# Patient Record
Sex: MALE | Race: BLACK OR AFRICAN AMERICAN | NOT HISPANIC OR LATINO | Employment: FULL TIME | URBAN - METROPOLITAN AREA
[De-identification: names, ages, dates, MRNs, and addresses within clinical notes are randomized per-mention and may not be internally consistent; named-entity substitution may affect disease eponyms.]

---

## 2019-08-12 ENCOUNTER — OFFICE VISIT (OUTPATIENT)
Dept: FAMILY MEDICINE CLINIC | Facility: CLINIC | Age: 29
End: 2019-08-12

## 2019-08-12 VITALS
HEIGHT: 75 IN | DIASTOLIC BLOOD PRESSURE: 86 MMHG | HEART RATE: 80 BPM | TEMPERATURE: 97.9 F | SYSTOLIC BLOOD PRESSURE: 140 MMHG | WEIGHT: 310.2 LBS | BODY MASS INDEX: 38.57 KG/M2 | RESPIRATION RATE: 16 BRPM | OXYGEN SATURATION: 98 %

## 2019-08-12 DIAGNOSIS — Z00.00 HEALTHCARE MAINTENANCE: Primary | ICD-10-CM

## 2019-08-12 LAB
ALBUMIN SERPL-MCNC: 4.6 G/DL (ref 3.5–5.2)
ALBUMIN/GLOB SERPL: 1.8 G/DL
ALP SERPL-CCNC: 56 U/L (ref 39–117)
ALT SERPL W P-5'-P-CCNC: 11 U/L (ref 1–41)
ANION GAP SERPL CALCULATED.3IONS-SCNC: 10.3 MMOL/L (ref 5–15)
AST SERPL-CCNC: 21 U/L (ref 1–40)
BASOPHILS # BLD AUTO: 0.05 10*3/MM3 (ref 0–0.2)
BASOPHILS NFR BLD AUTO: 0.6 % (ref 0–1.5)
BILIRUB BLD-MCNC: NEGATIVE MG/DL
BILIRUB SERPL-MCNC: 0.7 MG/DL (ref 0.2–1.2)
BUN BLD-MCNC: 16 MG/DL (ref 6–20)
BUN/CREAT SERPL: 16.5 (ref 7–25)
CALCIUM SPEC-SCNC: 9.7 MG/DL (ref 8.6–10.5)
CHLORIDE SERPL-SCNC: 101 MMOL/L (ref 98–107)
CHOLEST SERPL-MCNC: 134 MG/DL (ref 0–200)
CLARITY, POC: CLEAR
CO2 SERPL-SCNC: 26.7 MMOL/L (ref 22–29)
COLOR UR: YELLOW
CREAT BLD-MCNC: 0.97 MG/DL (ref 0.76–1.27)
CRP SERPL-MCNC: 0.46 MG/DL (ref 0–0.5)
DEPRECATED RDW RBC AUTO: 43 FL (ref 37–54)
EOSINOPHIL # BLD AUTO: 0.12 10*3/MM3 (ref 0–0.4)
EOSINOPHIL NFR BLD AUTO: 1.5 % (ref 0.3–6.2)
ERYTHROCYTE [DISTWIDTH] IN BLOOD BY AUTOMATED COUNT: 13.3 % (ref 12.3–15.4)
EXPIRATION DATE: ABNORMAL
GFR SERPL CREATININE-BSD FRML MDRD: 112 ML/MIN/1.73
GLOBULIN UR ELPH-MCNC: 2.5 GM/DL
GLUCOSE BLD-MCNC: 99 MG/DL (ref 65–99)
GLUCOSE UR STRIP-MCNC: NEGATIVE MG/DL
HBA1C MFR BLD: 5.3 % (ref 4.8–5.6)
HCT VFR BLD AUTO: 52.2 % (ref 37.5–51)
HDLC SERPL-MCNC: 53 MG/DL (ref 40–60)
HGB BLD-MCNC: 16.3 G/DL (ref 13–17.7)
HIV1+2 AB SER QL: NORMAL
IMM GRANULOCYTES # BLD AUTO: 0.01 10*3/MM3 (ref 0–0.05)
IMM GRANULOCYTES NFR BLD AUTO: 0.1 % (ref 0–0.5)
KETONES UR QL: NEGATIVE
LDLC SERPL CALC-MCNC: 68 MG/DL (ref 0–100)
LDLC/HDLC SERPL: 1.28 {RATIO}
LEUKOCYTE EST, POC: NEGATIVE
LYMPHOCYTES # BLD AUTO: 2.88 10*3/MM3 (ref 0.7–3.1)
LYMPHOCYTES NFR BLD AUTO: 36 % (ref 19.6–45.3)
Lab: ABNORMAL
MCH RBC QN AUTO: 27.8 PG (ref 26.6–33)
MCHC RBC AUTO-ENTMCNC: 31.2 G/DL (ref 31.5–35.7)
MCV RBC AUTO: 88.9 FL (ref 79–97)
MONOCYTES # BLD AUTO: 0.58 10*3/MM3 (ref 0.1–0.9)
MONOCYTES NFR BLD AUTO: 7.3 % (ref 5–12)
NEUTROPHILS # BLD AUTO: 4.36 10*3/MM3 (ref 1.7–7)
NEUTROPHILS NFR BLD AUTO: 54.5 % (ref 42.7–76)
NITRITE UR-MCNC: NEGATIVE MG/ML
NRBC BLD AUTO-RTO: 0 /100 WBC (ref 0–0.2)
PH UR: 5.5 [PH] (ref 5–8)
PLATELET # BLD AUTO: 298 10*3/MM3 (ref 140–450)
PMV BLD AUTO: 10.8 FL (ref 6–12)
POTASSIUM BLD-SCNC: 4.2 MMOL/L (ref 3.5–5.2)
PROT SERPL-MCNC: 7.1 G/DL (ref 6–8.5)
PROT UR STRIP-MCNC: NEGATIVE MG/DL
RBC # BLD AUTO: 5.87 10*6/MM3 (ref 4.14–5.8)
RBC # UR STRIP: ABNORMAL /UL
RPR SER QL: NORMAL
SODIUM BLD-SCNC: 138 MMOL/L (ref 136–145)
SP GR UR: 1.02 (ref 1–1.03)
TRIGL SERPL-MCNC: 65 MG/DL (ref 0–150)
TSH SERPL DL<=0.05 MIU/L-ACNC: 2.04 MIU/ML (ref 0.27–4.2)
URATE SERPL-MCNC: 6.4 MG/DL (ref 3.4–7)
UROBILINOGEN UR QL: NORMAL
VLDLC SERPL-MCNC: 13 MG/DL (ref 5–40)
WBC NRBC COR # BLD: 8 10*3/MM3 (ref 3.4–10.8)

## 2019-08-12 PROCEDURE — 86140 C-REACTIVE PROTEIN: CPT | Performed by: FAMILY MEDICINE

## 2019-08-12 PROCEDURE — 80053 COMPREHEN METABOLIC PANEL: CPT | Performed by: FAMILY MEDICINE

## 2019-08-12 PROCEDURE — 84550 ASSAY OF BLOOD/URIC ACID: CPT | Performed by: FAMILY MEDICINE

## 2019-08-12 PROCEDURE — 85025 COMPLETE CBC W/AUTO DIFF WBC: CPT | Performed by: FAMILY MEDICINE

## 2019-08-12 PROCEDURE — 81003 URINALYSIS AUTO W/O SCOPE: CPT | Performed by: FAMILY MEDICINE

## 2019-08-12 PROCEDURE — 86592 SYPHILIS TEST NON-TREP QUAL: CPT | Performed by: FAMILY MEDICINE

## 2019-08-12 PROCEDURE — 83036 HEMOGLOBIN GLYCOSYLATED A1C: CPT | Performed by: FAMILY MEDICINE

## 2019-08-12 PROCEDURE — G0432 EIA HIV-1/HIV-2 SCREEN: HCPCS | Performed by: FAMILY MEDICINE

## 2019-08-12 PROCEDURE — 36415 COLL VENOUS BLD VENIPUNCTURE: CPT | Performed by: FAMILY MEDICINE

## 2019-08-12 PROCEDURE — 80061 LIPID PANEL: CPT | Performed by: FAMILY MEDICINE

## 2019-08-12 PROCEDURE — 84443 ASSAY THYROID STIM HORMONE: CPT | Performed by: FAMILY MEDICINE

## 2019-08-12 PROCEDURE — 99385 PREV VISIT NEW AGE 18-39: CPT | Performed by: FAMILY MEDICINE

## 2019-08-12 NOTE — PROGRESS NOTES
"Subjective   Kelvin Borjas is a 28 y.o. male.     History of Present Illness   He is here for his annual fasting wellness evaluation.  He is current on his immunizations.  He voices concerns with his left plantar fascia and pain with first step of the day.    Medical History  I have reviewed and updated the following portions of the patient's history: allergies, current medications, past medical history, past surgical history, past family history, and past social history.    Review of Systems   Constitutional: Negative.    HENT: Negative.    Eyes: Negative.    Respiratory: Negative.    Cardiovascular: Negative.    Gastrointestinal: Negative.    Endocrine: Negative.    Genitourinary: Negative.    Musculoskeletal: Negative.         Left plantar fascia & achilles tightness attributed to \"walking on concrete all day.\"   Skin: Negative.    Allergic/Immunologic: Negative.    Neurological: Negative.    Hematological: Negative.    Psychiatric/Behavioral: Negative.      Objective   /86   Pulse 80   Temp 97.9 °F (36.6 °C) (Temporal)   Resp 16   Ht 190.5 cm (75\")   Wt (!) 141 kg (310 lb 3.2 oz)   SpO2 98%   BMI 38.77 kg/m²   Physical Exam   Constitutional: He is oriented to person, place, and time. He appears well-developed and well-nourished. He is cooperative.   HENT:   Head: Normocephalic and atraumatic.   Right Ear: Hearing and external ear normal.   Left Ear: Hearing and external ear normal.   Nose: Nose normal.   Mouth/Throat: Uvula is midline, oropharynx is clear and moist and mucous membranes are normal.   Eyes: Conjunctivae and EOM are normal. Pupils are equal, round, and reactive to light. No scleral icterus.   Neck: Trachea normal and normal range of motion. Neck supple. No JVD present. Carotid bruit is not present. No thyromegaly present.   Cardiovascular: Normal rate, regular rhythm, normal heart sounds and intact distal pulses.   Pulmonary/Chest: Effort normal and breath sounds normal. "   Abdominal: Soft. Bowel sounds are normal. There is no hepatosplenomegaly. There is no tenderness.   Musculoskeletal: Normal range of motion.   Bilateral pes planus identified.  Left plantar fascia tenderness   Lymphadenopathy:     He has no cervical adenopathy.   Neurological: He is alert and oriented to person, place, and time. He has normal strength and normal reflexes. No sensory deficit. Gait normal.   Skin: Skin is warm and dry.   Psychiatric: He has a normal mood and affect. His speech is normal and behavior is normal. Judgment and thought content normal. Cognition and memory are normal.   Nursing note and vitals reviewed.      Assessment/Plan   Diagnoses and all orders for this visit:    Healthcare maintenance  -     POC Urinalysis Dipstick, Automated  -     Comprehensive Metabolic Panel  -     CBC & Differential  -     Lipid Panel  -     TSH  -     Uric Acid  -     C-reactive Protein  -     HIV-1 / O / 2 Ag / Antibody 4th Generation  -     Chlamydia trachomatis, Neisseria gonorrhoeae, PCR - Urine, Urine, Clean Catch  -     RPR  -     Hemoglobin A1c    Elevated BP without a diagnosis of HTN  - Healthy heart diet  - Low sodium diet  - Daily aerobic exercise  - Routine blood pressure monitoring  - I encouraged routine BP monitoring with work place clinic/nurse  - Return to clinic if resting BP's consistently > 130/80  - [place] Heart Disease and Stroke Prevention Task Force pamphlet reviewed and administered.    Plantar fasciitis        - Stretches reviewed        - See PT at work clinic        - Inserts        - Weight loss        - Ice massage        - Naproxen with food prn    The patient is here for a health maintenance visit.  Currently, the patient consumes a calorie enriched diet and has an inadequate exercise regimen. Screening lab work is ordered.  Immunizations are reviewed today.  Advice and education is given regarding nutrition, aerobic exercise, routine dental evaluations, routine eye exams,  reproductive health, cardiovascular risk reduction, sunscreen use, self skin examination (annual dermatology evaluations) and seat belt use (general overall safety).  Further recommendations after lab evaluation.  Annual wellness evaluations recommended.

## 2019-09-23 ENCOUNTER — OFFICE VISIT (OUTPATIENT)
Dept: FAMILY MEDICINE CLINIC | Facility: CLINIC | Age: 29
End: 2019-09-23

## 2019-09-23 VITALS
RESPIRATION RATE: 20 BRPM | OXYGEN SATURATION: 98 % | TEMPERATURE: 97.9 F | WEIGHT: 310.8 LBS | SYSTOLIC BLOOD PRESSURE: 122 MMHG | HEIGHT: 75 IN | HEART RATE: 92 BPM | BODY MASS INDEX: 38.64 KG/M2 | DIASTOLIC BLOOD PRESSURE: 74 MMHG

## 2019-09-23 DIAGNOSIS — R03.0 ELEVATED BLOOD PRESSURE READING: Primary | ICD-10-CM

## 2019-09-23 PROCEDURE — 99212 OFFICE O/P EST SF 10 MIN: CPT | Performed by: FAMILY MEDICINE

## 2019-09-23 NOTE — PROGRESS NOTES
Subjective   Kelvin Borjas is a 29 y.o. male.     History of Present Illness   He is here to follow up on his elevated blood pressure.  He has implemented lifestyle changes to include routine exercise.  He continues to struggle with weight loss and daily calorie restriction.  He reports improved blood pressure readings at home.  He voices no other concerns.    Review of Systems   Respiratory: Negative for shortness of breath.    Cardiovascular: Negative for chest pain, palpitations and leg swelling.   Neurological: Negative for headaches.       Objective   Physical Exam   Constitutional: He is oriented to person, place, and time. He appears well-developed and well-nourished.   HENT:   Head: Normocephalic and atraumatic.   Right Ear: Hearing normal.   Left Ear: Hearing normal.   Mouth/Throat: Mucous membranes are normal.   Eyes: Conjunctivae and EOM are normal. Pupils are equal, round, and reactive to light.   Neck: Neck supple. No JVD present. No thyromegaly present.   Cardiovascular: Normal rate, regular rhythm and normal heart sounds.   Pulmonary/Chest: Effort normal and breath sounds normal.   Musculoskeletal: Normal range of motion.   Neurological: He is alert and oriented to person, place, and time. He has normal strength. No sensory deficit. Gait normal.   Skin: Skin is warm and dry.   Psychiatric: He has a normal mood and affect. His behavior is normal. Judgment and thought content normal. Cognition and memory are normal.   Nursing note and vitals reviewed.      Assessment/Plan   Diagnoses and all orders for this visit:    Elevated blood pressure reading resolved on today's evaluation and home BP monitoring  - Healthy heart diet / DASH diet  - Low sodium diet  - Daily aerobic exercise > 40' > 3 x / week  - Routine blood pressure monitoring  - [place] Heart Disease and Stroke Prevention Task Force pamphlet reviewed and administered.

## 2020-08-31 PROCEDURE — 99283 EMERGENCY DEPT VISIT LOW MDM: CPT

## 2020-09-01 ENCOUNTER — APPOINTMENT (OUTPATIENT)
Dept: GENERAL RADIOLOGY | Facility: HOSPITAL | Age: 30
End: 2020-09-01

## 2020-09-01 ENCOUNTER — HOSPITAL ENCOUNTER (EMERGENCY)
Facility: HOSPITAL | Age: 30
Discharge: HOME | End: 2020-09-01
Attending: EMERGENCY MEDICINE | Admitting: EMERGENCY MEDICINE

## 2020-09-01 VITALS
OXYGEN SATURATION: 98 % | BODY MASS INDEX: 39.17 KG/M2 | HEART RATE: 66 BPM | DIASTOLIC BLOOD PRESSURE: 85 MMHG | SYSTOLIC BLOOD PRESSURE: 145 MMHG | RESPIRATION RATE: 18 BRPM | TEMPERATURE: 98 F | WEIGHT: 315 LBS | HEIGHT: 75 IN

## 2020-09-01 DIAGNOSIS — S39.012A STRAIN OF MUSCLE, FASCIA AND TENDON OF LOWER BACK, INITIAL ENCOUNTER: Primary | ICD-10-CM

## 2020-09-01 PROCEDURE — 72100 X-RAY EXAM L-S SPINE 2/3 VWS: CPT

## 2020-09-01 RX ORDER — TRAMADOL HYDROCHLORIDE 50 MG/1
50 TABLET ORAL EVERY 6 HOURS PRN
Qty: 12 TABLET | Refills: 0 | Status: SHIPPED | OUTPATIENT
Start: 2020-09-01 | End: 2021-02-22

## 2020-09-01 RX ORDER — TRAMADOL HYDROCHLORIDE 50 MG/1
50 TABLET ORAL ONCE
Status: COMPLETED | OUTPATIENT
Start: 2020-09-01 | End: 2020-09-01

## 2020-09-01 RX ADMIN — TRAMADOL HYDROCHLORIDE 50 MG: 50 TABLET, FILM COATED ORAL at 04:17

## 2020-09-01 NOTE — DISCHARGE INSTRUCTIONS
Apply heat to the low back and perform regular stretching.    Take Tylenol or ibuprofen to help with pain.  Take Ultram as needed for more severe pain.    Follow-up with primary care physician for recheck in 1 week.

## 2020-09-01 NOTE — ED PROVIDER NOTES
Subjective   49-year-old male presents with complaint of midline low back pain.  The patient reports that about 8 PM yesterday he was attempting to pull a weed out the ground and felt a pop in the midline of his low back.  He reports he was unable to follow-up the week.  The pain is isolated to the low back.  It is exacerbated whenever he sitting up or standing up and improved whenever he is lying flat.  He reports taking some ibuprofen with very minimal improvement in pain.  He denies any previous direct trauma or injury to his low back.  No previous surgical intervention to his back.  No reported fever or systemic symptoms of infection.  He denies any history of cancer or unexpected weight loss.  He denies any saddle anesthesia, bowel incontinence or retention, or urinary incontinence or retention.  Reported infectious symptoms or respiratory symptoms.  He has been able to stand and ambulate since the accident occurred.          Review of Systems   Constitutional: Negative for chills, fatigue and fever.   HENT: Negative for congestion, ear pain, postnasal drip, sinus pressure and sore throat.    Eyes: Negative for pain, redness and visual disturbance.   Respiratory: Negative for cough, chest tightness and shortness of breath.    Cardiovascular: Negative for chest pain, palpitations and leg swelling.   Gastrointestinal: Negative for abdominal pain, anal bleeding, blood in stool, diarrhea, nausea and vomiting.   Endocrine: Negative for polydipsia and polyuria.   Genitourinary: Negative for difficulty urinating, dysuria, frequency and urgency.   Musculoskeletal: Positive for back pain. Negative for arthralgias and neck pain.   Skin: Negative for pallor and rash.   Allergic/Immunologic: Negative for environmental allergies and immunocompromised state.   Neurological: Negative for dizziness, weakness and headaches.   Hematological: Negative for adenopathy.   Psychiatric/Behavioral: Negative for confusion, self-injury  and suicidal ideas. The patient is not nervous/anxious.    All other systems reviewed and are negative.      Past Medical History:   Diagnosis Date   • Obesity    • Pes planus        No Known Allergies    Past Surgical History:   Procedure Laterality Date   • NO PAST SURGERIES         Family History   Problem Relation Age of Onset   • Hypertension Mother    • Hypertension Father        Social History     Socioeconomic History   • Marital status: Single     Spouse name: Etta   • Number of children: 1   • Years of education: H.S.   • Highest education level: High school graduate   Occupational History   • Occupation: Associate     Employer: [employer]   Tobacco Use   • Smoking status: Never Smoker   • Smokeless tobacco: Never Used   Substance and Sexual Activity   • Alcohol use: Yes     Frequency: Monthly or less   • Drug use: No   • Sexual activity: Yes     Partners: Female           Objective   Physical Exam   Constitutional: He is oriented to person, place, and time. He appears well-developed and well-nourished.  Non-toxic appearance. No distress.   HENT:   Head: Normocephalic and atraumatic.   Right Ear: External ear normal.   Left Ear: External ear normal.   Nose: Nose normal.   Eyes: Pupils are equal, round, and reactive to light. EOM and lids are normal.   Neck: Normal range of motion. Neck supple. No tracheal deviation present.   Cardiovascular: Normal rate, regular rhythm and normal heart sounds. Exam reveals no gallop, no friction rub and no decreased pulses.   No murmur heard.  Pulmonary/Chest: Effort normal and breath sounds normal. No respiratory distress. He has no decreased breath sounds. He has no wheezes. He has no rhonchi. He has no rales.   Abdominal: Soft. Normal appearance and bowel sounds are normal. There is no tenderness. There is no rebound and no guarding.   Musculoskeletal: Normal range of motion. He exhibits no deformity.        Lumbar back: He exhibits tenderness and bony tenderness. He  exhibits normal range of motion and no deformity.        Back:    Lymphadenopathy:     He has no cervical adenopathy.   Neurological: He is alert and oriented to person, place, and time. He has normal strength. No cranial nerve deficit or sensory deficit.   Skin: Skin is warm and dry. No rash noted. He is not diaphoretic.   Psychiatric: He has a normal mood and affect. His speech is normal and behavior is normal. Judgment and thought content normal. Cognition and memory are normal.   Nursing note and vitals reviewed.      Procedures           ED Course                                           MDM  Number of Diagnoses or Management Options  Strain of muscle, fascia and tendon of lower back, initial encounter: new and requires workup  Diagnosis management comments: No acute injuries identified on x-ray of the lumbar spine.    Patient be discharged and advised to apply heat for no more than 20 minutes at a time before regular stretching and light activity such as walking.    We will advised to take Tylenol or ibuprofen as needed help with pain, take Ultram as needed for more severe pain.    Follow-up with primary care physician for recheck in 1 week.       Amount and/or Complexity of Data Reviewed  Tests in the radiology section of CPT®: ordered and reviewed  Review and summarize past medical records: yes  Independent visualization of images, tracings, or specimens: yes        Final diagnoses:   Strain of muscle, fascia and tendon of lower back, initial encounter            Carla Case MD  09/01/20 2721

## 2021-02-22 ENCOUNTER — HOSPITAL ENCOUNTER (OUTPATIENT)
Dept: GENERAL RADIOLOGY | Facility: HOSPITAL | Age: 31
Discharge: HOME | End: 2021-02-22
Admitting: FAMILY MEDICINE

## 2021-02-22 ENCOUNTER — OFFICE VISIT (OUTPATIENT)
Dept: FAMILY MEDICINE CLINIC | Facility: CLINIC | Age: 31
End: 2021-02-22

## 2021-02-22 VITALS
RESPIRATION RATE: 16 BRPM | HEIGHT: 75 IN | DIASTOLIC BLOOD PRESSURE: 78 MMHG | OXYGEN SATURATION: 98 % | SYSTOLIC BLOOD PRESSURE: 138 MMHG | WEIGHT: 315 LBS | TEMPERATURE: 98 F | HEART RATE: 77 BPM | BODY MASS INDEX: 39.17 KG/M2

## 2021-02-22 DIAGNOSIS — T81.506A: ICD-10-CM

## 2021-02-22 DIAGNOSIS — Z00.00 ANNUAL PHYSICAL EXAM: ICD-10-CM

## 2021-02-22 DIAGNOSIS — T81.506A: Primary | ICD-10-CM

## 2021-02-22 PROCEDURE — 73130 X-RAY EXAM OF HAND: CPT

## 2021-02-22 PROCEDURE — 99395 PREV VISIT EST AGE 18-39: CPT | Performed by: FAMILY MEDICINE

## 2021-02-22 PROCEDURE — 99213 OFFICE O/P EST LOW 20 MIN: CPT | Performed by: FAMILY MEDICINE
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
## 2021-02-22 NOTE — PATIENT INSTRUCTIONS

## 2024-08-08 ENCOUNTER — TELEPHONE (OUTPATIENT)
Dept: FAMILY MEDICINE CLINIC | Facility: CLINIC | Age: 34
End: 2024-08-08
Payer: COMMERCIAL

## 2024-08-08 NOTE — TELEPHONE ENCOUNTER
Pt called stating he has been suffering from chest pain/ tightness, reports nurse friend of his had taken his BP and pulse and reported it sounded as tho his heartbeat is irregular. Pt is establishing care w/ this office but has not yet been seen here, appt 9/10. Advised Pt to seek care at ER or [facility] given gravity of symptoms and gap between now and appt date. Pt stated he understood

## 2024-08-13 ENCOUNTER — HOSPITAL ENCOUNTER (EMERGENCY)
Facility: HOSPITAL | Age: 34
Discharge: HOME | End: 2024-08-14
Attending: EMERGENCY MEDICINE
Payer: COMMERCIAL

## 2024-08-13 ENCOUNTER — APPOINTMENT (OUTPATIENT)
Facility: HOSPITAL | Age: 34
End: 2024-08-13
Payer: COMMERCIAL

## 2024-08-13 DIAGNOSIS — R00.2 PALPITATION: Primary | ICD-10-CM

## 2024-08-13 LAB
ALBUMIN SERPL-MCNC: 3.8 G/DL (ref 3.5–5.2)
ALBUMIN/GLOB SERPL: 1.4 G/DL
ALP SERPL-CCNC: 53 U/L (ref 39–117)
ALT SERPL W P-5'-P-CCNC: 18 U/L (ref 1–41)
ANION GAP SERPL CALCULATED.3IONS-SCNC: 9 MMOL/L (ref 5–15)
AST SERPL-CCNC: 26 U/L (ref 1–40)
BASOPHILS # BLD AUTO: 0.03 10*3/MM3 (ref 0–0.2)
BASOPHILS NFR BLD AUTO: 0.4 % (ref 0–1.5)
BILIRUB SERPL-MCNC: 0.4 MG/DL (ref 0–1.2)
BUN SERPL-MCNC: 12 MG/DL (ref 6–20)
BUN/CREAT SERPL: 14 (ref 7–25)
CALCIUM SPEC-SCNC: 9.3 MG/DL (ref 8.6–10.5)
CHLORIDE SERPL-SCNC: 106 MMOL/L (ref 98–107)
CO2 SERPL-SCNC: 23 MMOL/L (ref 22–29)
CREAT SERPL-MCNC: 0.86 MG/DL (ref 0.76–1.27)
DEPRECATED RDW RBC AUTO: 38.3 FL (ref 37–54)
EGFRCR SERPLBLD CKD-EPI 2021: 117.3 ML/MIN/1.73
EOSINOPHIL # BLD AUTO: 0.17 10*3/MM3 (ref 0–0.4)
EOSINOPHIL NFR BLD AUTO: 2.2 % (ref 0.3–6.2)
ERYTHROCYTE [DISTWIDTH] IN BLOOD BY AUTOMATED COUNT: 12.5 % (ref 12.3–15.4)
GLOBULIN UR ELPH-MCNC: 2.7 GM/DL
GLUCOSE SERPL-MCNC: 96 MG/DL (ref 65–99)
HCT VFR BLD AUTO: 44.1 % (ref 37.5–51)
HGB BLD-MCNC: 15.1 G/DL (ref 13–17.7)
IMM GRANULOCYTES # BLD AUTO: 0.02 10*3/MM3 (ref 0–0.05)
IMM GRANULOCYTES NFR BLD AUTO: 0.3 % (ref 0–0.5)
LYMPHOCYTES # BLD AUTO: 2.28 10*3/MM3 (ref 0.7–3.1)
LYMPHOCYTES NFR BLD AUTO: 29.5 % (ref 19.6–45.3)
MCH RBC QN AUTO: 28.5 PG (ref 26.6–33)
MCHC RBC AUTO-ENTMCNC: 34.2 G/DL (ref 31.5–35.7)
MCV RBC AUTO: 83.2 FL (ref 79–97)
MONOCYTES # BLD AUTO: 0.67 10*3/MM3 (ref 0.1–0.9)
MONOCYTES NFR BLD AUTO: 8.7 % (ref 5–12)
NEUTROPHILS NFR BLD AUTO: 4.56 10*3/MM3 (ref 1.7–7)
NEUTROPHILS NFR BLD AUTO: 58.9 % (ref 42.7–76)
PLATELET # BLD AUTO: 277 10*3/MM3 (ref 140–450)
PMV BLD AUTO: 10.5 FL (ref 6–12)
POTASSIUM SERPL-SCNC: 4.2 MMOL/L (ref 3.5–5.2)
PROT SERPL-MCNC: 6.5 G/DL (ref 6–8.5)
RBC # BLD AUTO: 5.3 10*6/MM3 (ref 4.14–5.8)
SODIUM SERPL-SCNC: 138 MMOL/L (ref 136–145)
TROPONIN T SERPL HS-MCNC: 33 NG/L
WBC NRBC COR # BLD AUTO: 7.73 10*3/MM3 (ref 3.4–10.8)

## 2024-08-13 PROCEDURE — 85025 COMPLETE CBC W/AUTO DIFF WBC: CPT | Performed by: EMERGENCY MEDICINE

## 2024-08-13 PROCEDURE — 71045 X-RAY EXAM CHEST 1 VIEW: CPT

## 2024-08-13 PROCEDURE — 99284 EMERGENCY DEPT VISIT MOD MDM: CPT

## 2024-08-13 PROCEDURE — 93005 ELECTROCARDIOGRAM TRACING: CPT | Performed by: EMERGENCY MEDICINE

## 2024-08-13 PROCEDURE — 84484 ASSAY OF TROPONIN QUANT: CPT | Performed by: EMERGENCY MEDICINE

## 2024-08-13 PROCEDURE — 80053 COMPREHEN METABOLIC PANEL: CPT | Performed by: EMERGENCY MEDICINE

## 2024-08-13 PROCEDURE — 36415 COLL VENOUS BLD VENIPUNCTURE: CPT

## 2024-08-13 NOTE — Clinical Note
[facility] EMERGENCY DEPARTMENT [place]  [address]  [facility] [address]  Phone: [phone]  Fax: [phone]    Kelvin Borjas was seen and treated in our emergency department on 8/13/2024.  He may return to work on 08/15/2024.         Thank you for choosing [facility].    Familia Jeronimo MD

## 2024-08-13 NOTE — Clinical Note
[facility] EMERGENCY DEPARTMENT [place]  [address]  [place] [facility] [place] [address]  Phone: [phone]  Fax: [phone]    Kelvin Borjas was seen and treated in our emergency department on 8/13/2024.  He may return to work on 08/15/2024.         Thank you for choosing [facility].    Familia Jeronimo MD

## 2024-08-14 VITALS
BODY MASS INDEX: 39.17 KG/M2 | HEART RATE: 84 BPM | WEIGHT: 315 LBS | HEIGHT: 75 IN | TEMPERATURE: 98.4 F | SYSTOLIC BLOOD PRESSURE: 154 MMHG | OXYGEN SATURATION: 100 % | DIASTOLIC BLOOD PRESSURE: 84 MMHG | RESPIRATION RATE: 20 BRPM

## 2024-08-14 LAB
GEN 5 2HR TROPONIN T REFLEX: 36 NG/L
TROPONIN T DELTA: 3 NG/L

## 2024-08-14 PROCEDURE — 84484 ASSAY OF TROPONIN QUANT: CPT | Performed by: EMERGENCY MEDICINE

## 2024-08-14 NOTE — FSED PROVIDER NOTE
Subjective   History of Present Illness  Patient presents to the emergency department for palpitations.  Says she has had palpitations since 2020 happen randomly sometimes as infrequently as every few months.  Says it feels like his heart starts beating and usually does not feeling as racing feels almost like it is beating slower.  He denies chest pain during these times.  Had an episode earlier today at work and they instructed him to come here.  He is asymptomatic at this time says he is not having any pain pressure or palpitations.  No shortness of breath lightheadedness recent illnesses or other symptoms.    History provided by:  Patient   used: No        Review of Systems   Cardiovascular:  Positive for palpitations.   All other systems reviewed and are negative.      Past Medical History:   Diagnosis Date    Obesity     Pes planus        No Known Allergies    Past Surgical History:   Procedure Laterality Date    NO PAST SURGERIES         Family History   Problem Relation Age of Onset    Hypertension Mother     Hypertension Father     Prostate cancer Father     No Known Problems Brother     Eczema Daughter     Eczema Son     No Known Problems Maternal Grandmother     No Known Problems Maternal Grandfather     Ovarian cancer Paternal Grandmother     Breast cancer Paternal Grandmother     Glaucoma Paternal Grandfather        Social History     Socioeconomic History    Marital status: Single     Spouse name: Etta    Number of children: 1    Years of education: H.S.    Highest education level: High school graduate   Tobacco Use    Smoking status: Never    Smokeless tobacco: Never   Substance and Sexual Activity    Alcohol use: Yes     Comment: socially     Drug use: No    Sexual activity: Yes     Partners: Female           Objective   Physical Exam  Vitals and nursing note reviewed.   Constitutional:       General: He is not in acute distress.     Appearance: He is obese.   HENT:      Head:  Normocephalic and atraumatic.      Nose: Nose normal. No congestion.      Mouth/Throat:      Mouth: Mucous membranes are moist.      Pharynx: Oropharynx is clear.   Eyes:      Extraocular Movements: Extraocular movements intact.      Pupils: Pupils are equal, round, and reactive to light.   Cardiovascular:      Rate and Rhythm: Normal rate and regular rhythm.      Heart sounds: Normal heart sounds.   Pulmonary:      Effort: Pulmonary effort is normal. No respiratory distress.      Breath sounds: Normal breath sounds. No wheezing or rales.   Abdominal:      General: There is no distension.      Palpations: Abdomen is soft.      Tenderness: There is no abdominal tenderness. There is no guarding.   Musculoskeletal:         General: No swelling, tenderness, deformity or signs of injury. Normal range of motion.      Cervical back: Normal range of motion and neck supple.   Skin:     General: Skin is warm and dry.   Neurological:      General: No focal deficit present.      Mental Status: He is alert and oriented to person, place, and time.      Cranial Nerves: No cranial nerve deficit.   Psychiatric:         Mood and Affect: Mood normal.         Behavior: Behavior normal.         ECG 12 Lead      Date/Time: 8/13/2024 10:26 PM    Performed by: Familia Jeronimo MD  Authorized by: Familia Jeronimo MD  Interpreted by ED physician  Rhythm: sinus rhythm  Rate: normal  BPM: 67  QRS axis: normal  ST Segments: ST segments normal  Other findings: LVH and prolonged QTc interval  Clinical impression: abnormal ECG               ED Course                                           Medical Decision Making  Hemodynamically stable and afebrile.  EKG shows no acute ischemic changes.  Laboratory evaluation is unremarkable.  Troponins are level.  He is asymptomatic at times that he would like to go home.  Given return precautions and ambulatory follow-up with cardiology.  Discharge    Problems Addressed:  Palpitation: complicated acute illness  or injury    Amount and/or Complexity of Data Reviewed  Labs: ordered. Decision-making details documented in ED Course.  Radiology: ordered. Decision-making details documented in ED Course.  ECG/medicine tests: ordered and independent interpretation performed. Decision-making details documented in ED Course.        Final diagnoses:   Palpitation       ED Disposition  ED Disposition       ED Disposition   Discharge    Condition   Stable    Comment   --               [facility] EMERGENCY DEPARTMENT [place]  [address]  [facility] [id number] [phone]  Go to   As needed    Tomi Davis MD  [address]  [place] [address]  [phone]    Schedule an appointment as soon as possible for a visit   As needed         Medication List      No changes were made to your prescriptions during this visit.

## 2024-08-19 LAB
QT INTERVAL: 460 MS
QTC INTERVAL: 486 MS

## 2024-09-16 ENCOUNTER — OFFICE VISIT (OUTPATIENT)
Dept: CARDIOLOGY | Facility: CLINIC | Age: 34
End: 2024-09-16
Payer: COMMERCIAL

## 2024-09-16 ENCOUNTER — PATIENT ROUNDING (BHMG ONLY) (OUTPATIENT)
Dept: CARDIOLOGY | Facility: CLINIC | Age: 34
End: 2024-09-16
Payer: COMMERCIAL

## 2024-09-16 VITALS
SYSTOLIC BLOOD PRESSURE: 142 MMHG | HEART RATE: 76 BPM | DIASTOLIC BLOOD PRESSURE: 90 MMHG | HEIGHT: 75 IN | BODY MASS INDEX: 39.17 KG/M2 | WEIGHT: 315 LBS | OXYGEN SATURATION: 96 %

## 2024-09-16 DIAGNOSIS — R00.2 PALPITATIONS: Primary | ICD-10-CM

## 2024-09-16 PROCEDURE — 93000 ELECTROCARDIOGRAM COMPLETE: CPT | Performed by: INTERNAL MEDICINE

## 2024-09-16 PROCEDURE — 99204 OFFICE O/P NEW MOD 45 MIN: CPT | Performed by: INTERNAL MEDICINE

## 2024-09-24 ENCOUNTER — HOSPITAL ENCOUNTER (OUTPATIENT)
Facility: HOSPITAL | Age: 34
Discharge: HOME | End: 2024-09-24
Admitting: INTERNAL MEDICINE
Payer: COMMERCIAL

## 2024-09-24 VITALS — WEIGHT: 315 LBS | HEIGHT: 75 IN | BODY MASS INDEX: 39.17 KG/M2

## 2024-09-24 DIAGNOSIS — R00.2 PALPITATIONS: ICD-10-CM

## 2024-09-24 LAB
ASCENDING AORTA: 3.2 CM
AV HCM GRAD VALS: 48 MMHG
AV LVOT PEAK GRADIENT: 24 MMHG
BH CV ECHO LEFT VENTRICLE BASAL CAVITARY GRADIENT: 48 MMHG
BH CV ECHO LEFT VENTRICLE MID CAVITARY GRADIENT: 32 MMHG
BH CV ECHO MEAS - AO MAX PG: 17.7 MMHG
BH CV ECHO MEAS - AO MEAN PG: 9.3 MMHG
BH CV ECHO MEAS - AO ROOT DIAM: 3.4 CM
BH CV ECHO MEAS - AO V2 MAX: 210.5 CM/SEC
BH CV ECHO MEAS - AO V2 VTI: 33.2 CM
BH CV ECHO MEAS - AVA(I,D): 2.5 CM2
BH CV ECHO MEAS - EDV(CUBED): 64 ML
BH CV ECHO MEAS - EDV(MOD-SP2): 107 ML
BH CV ECHO MEAS - EDV(MOD-SP4): 139 ML
BH CV ECHO MEAS - EF(MOD-BP): 86.4 %
BH CV ECHO MEAS - EF(MOD-SP2): 83.3 %
BH CV ECHO MEAS - EF(MOD-SP4): 87.9 %
BH CV ECHO MEAS - ESV(CUBED): 11.4 ML
BH CV ECHO MEAS - ESV(MOD-SP2): 17.9 ML
BH CV ECHO MEAS - ESV(MOD-SP4): 16.8 ML
BH CV ECHO MEAS - FS: 43.8 %
BH CV ECHO MEAS - IVS/LVPW: 1.24 CM
BH CV ECHO MEAS - IVSD: 2.9 CM
BH CV ECHO MEAS - LA DIMENSION: 5.2 CM
BH CV ECHO MEAS - LAT PEAK E' VEL: 4.9 CM/SEC
BH CV ECHO MEAS - LV DIASTOLIC VOL/BSA (35-75): 51.2 CM2
BH CV ECHO MEAS - LV MASS(C)D: 397.7 GRAMS
BH CV ECHO MEAS - LV MAX PG: 10.9 MMHG
BH CV ECHO MEAS - LV MEAN PG: 6 MMHG
BH CV ECHO MEAS - LV SYSTOLIC VOL/BSA (12-30): 6.2 CM2
BH CV ECHO MEAS - LV V1 MAX: 165 CM/SEC
BH CV ECHO MEAS - LV V1 VTI: 25.4 CM
BH CV ECHO MEAS - LVIDD: 4 CM
BH CV ECHO MEAS - LVIDS: 2.25 CM
BH CV ECHO MEAS - LVOT AREA: 3.3 CM2
BH CV ECHO MEAS - LVOT DIAM: 2.05 CM
BH CV ECHO MEAS - LVPWD: 1.5 CM
BH CV ECHO MEAS - MED PEAK E' VEL: 4 CM/SEC
BH CV ECHO MEAS - MV A MAX VEL: 48.9 CM/SEC
BH CV ECHO MEAS - MV DEC SLOPE: 466.5 CM/SEC2
BH CV ECHO MEAS - MV DEC TIME: 0.22 SEC
BH CV ECHO MEAS - MV E MAX VEL: 43.3 CM/SEC
BH CV ECHO MEAS - MV E/A: 0.89
BH CV ECHO MEAS - MV MAX PG: 5.6 MMHG
BH CV ECHO MEAS - MV MEAN PG: 2 MMHG
BH CV ECHO MEAS - MV P1/2T: 76 MSEC
BH CV ECHO MEAS - MV V2 VTI: 24.7 CM
BH CV ECHO MEAS - MVA(P1/2T): 2.9 CM2
BH CV ECHO MEAS - MVA(VTI): 3.4 CM2
BH CV ECHO MEAS - PA ACC TIME: 0.13 SEC
BH CV ECHO MEAS - PA V2 MAX: 123 CM/SEC
BH CV ECHO MEAS - SV(LVOT): 83.8 ML
BH CV ECHO MEAS - SV(MOD-SP2): 89.1 ML
BH CV ECHO MEAS - SV(MOD-SP4): 122.2 ML
BH CV ECHO MEAS - SVI(LVOT): 30.9 ML/M2
BH CV ECHO MEAS - SVI(MOD-SP2): 32.8 ML/M2
BH CV ECHO MEAS - SVI(MOD-SP4): 45 ML/M2
BH CV ECHO MEAS - TAPSE (>1.6): 1.98 CM
BH CV ECHO MEASUREMENTS AVERAGE E/E' RATIO: 9.73
BH CV VAS BP LEFT ARM: NORMAL MMHG
BH CV XLRA - RV BASE: 4.1 CM
BH CV XLRA - RV LENGTH: 10.1 CM
BH CV XLRA - RV MID: 3.5 CM
BH CV XLRA - TDI S': 12.5 CM/SEC
IVRT: 169 MS
LEFT ATRIUM VOLUME INDEX: 25.6 ML/M2

## 2024-09-24 PROCEDURE — 93306 TTE W/DOPPLER COMPLETE: CPT | Performed by: INTERNAL MEDICINE

## 2024-09-24 PROCEDURE — 93306 TTE W/DOPPLER COMPLETE: CPT

## 2024-09-24 PROCEDURE — 25010000002 SULFUR HEXAFLUORIDE MICROSPH 60.7-25 MG RECONSTITUTED SUSPENSION: Performed by: INTERNAL MEDICINE

## 2024-09-24 RX ADMIN — SULFUR HEXAFLUORIDE 2 ML: KIT at 10:40

## 2024-09-25 DIAGNOSIS — I42.1 CARDIOMYOPATHY, HYPERTROPHIC OBSTRUCTIVE: Primary | ICD-10-CM

## 2024-09-25 RX ORDER — BISOPROLOL FUMARATE 5 MG/1
2.5 TABLET, FILM COATED ORAL DAILY
Qty: 15 TABLET | Refills: 11 | Status: SHIPPED | OUTPATIENT
Start: 2024-09-25

## 2024-10-11 ENCOUNTER — HOSPITAL ENCOUNTER (OUTPATIENT)
Facility: HOSPITAL | Age: 34
Discharge: HOME | End: 2024-10-11
Admitting: INTERNAL MEDICINE
Payer: COMMERCIAL

## 2024-10-11 PROCEDURE — A9577 INJ MULTIHANCE: HCPCS | Performed by: INTERNAL MEDICINE

## 2024-10-11 PROCEDURE — 75561 CARDIAC MRI FOR MORPH W/DYE: CPT

## 2024-10-11 PROCEDURE — 0 GADOBENATE DIMEGLUMINE 529 MG/ML SOLUTION: Performed by: INTERNAL MEDICINE

## 2024-10-11 RX ADMIN — GADOBENATE DIMEGLUMINE 30 ML: 529 INJECTION, SOLUTION INTRAVENOUS at 10:40

## 2024-10-17 ENCOUNTER — OFFICE VISIT (OUTPATIENT)
Dept: CARDIOLOGY | Facility: CLINIC | Age: 34
End: 2024-10-17
Payer: COMMERCIAL

## 2024-10-17 VITALS
WEIGHT: 315 LBS | OXYGEN SATURATION: 94 % | BODY MASS INDEX: 39.17 KG/M2 | SYSTOLIC BLOOD PRESSURE: 130 MMHG | HEART RATE: 75 BPM | HEIGHT: 75 IN | DIASTOLIC BLOOD PRESSURE: 82 MMHG

## 2024-10-17 VITALS
BODY MASS INDEX: 40.43 KG/M2 | DIASTOLIC BLOOD PRESSURE: 82 MMHG | SYSTOLIC BLOOD PRESSURE: 130 MMHG | OXYGEN SATURATION: 94 % | WEIGHT: 315 LBS | HEART RATE: 64 BPM | HEIGHT: 74 IN

## 2024-10-17 DIAGNOSIS — I42.1 HOCM (HYPERTROPHIC OBSTRUCTIVE CARDIOMYOPATHY): Primary | ICD-10-CM

## 2024-10-17 DIAGNOSIS — R00.2 PALPITATIONS: ICD-10-CM

## 2024-10-17 DIAGNOSIS — I42.1 HOCM (HYPERTROPHIC OBSTRUCTIVE CARDIOMYOPATHY): Primary | Chronic | ICD-10-CM

## 2024-10-17 PROCEDURE — 99214 OFFICE O/P EST MOD 30 MIN: CPT | Performed by: INTERNAL MEDICINE

## 2024-10-17 NOTE — PROGRESS NOTES
Subjective:     Encounter Date:10/17/2024    Primary Care Physician: Provider, No Known      Patient ID: Kelvin Borjas is a 34 y.o. male.    Chief Complaint:Palpitations    PROBLEM LIST:  HOCM  10/2024 cardiac MRI hypertrophic cardiomyopathy with maximum wall thickness of 34 mm in the basal/mid inferior septum.  EF 54%.  No significant LVOT obstruction.  Diffuse heterogeneous enhancement especially in the mid and distal segments involving greater than 15% of the myocardium.  No significant valve abnormalities.  Morbid obesity  Surgeries:  [place] teeth extraction       No Known Allergies      Current Outpatient Medications:     bisoprolol (ZEBeta) 5 MG tablet, Take 0.5 tablets by mouth Daily. (Patient not taking: Reported on 10/17/2024), Disp: 15 tablet, Rfl: 11        History of Present Illness    Patient presents today for follow-up after recent echo and cardiac MRI. Since last being seen patient notes that he had to stop wearing his monitor early due to skin irritation. Notes that he did have some episodes while the monitor was on. We currently do not have the results available. Patient notes that he has had some recurrent dizziness since last visit. No loss of consciousness. Given recent abnormal findings he was brought into the office for further discussion.      The following portions of the patient's history were reviewed and updated as appropriate: allergies, current medications, past family history, past medical history, past social history, past surgical history and problem list.      Social History     Tobacco Use    Smoking status: Never     Passive exposure: Never    Smokeless tobacco: Never   Vaping Use    Vaping status: Never Used   Substance Use Topics    Alcohol use: Yes     Alcohol/week: 3.0 standard drinks of alcohol     Types: 3 Shots of liquor per week     Comment: socially     Drug use: No         ROS       Objective:   /82 (BP Location: Right arm, Patient Position: Sitting, Cuff  "Size: Adult)   Pulse 75   Ht 190.5 cm (75\")   Wt (!) 153 kg (336 lb 9.6 oz)   SpO2 94%   BMI 42.07 kg/m²         Physical Exam    Procedures          Assessment:   Assessment & Plan      Diagnoses and all orders for this visit:    1. HOCM (hypertrophic obstructive cardiomyopathy) (Primary)    2. Palpitations      Assessment:  HOCM- MRI with maximum wall thickness of 34 mm. No noted obstruction. Patient also with diffuse heterogeneous enhancement especially in the mid and distal segments involving greater than 15% of the myocardium.   Palpitations, monitor pending.     Plan:  Reviewed ECHO and MRI results with patient in the office today.   Discussed potential adverse events/life threatening issues with HOCM.  Will refer to Dr. Davis with Electrophysiology for consideration of ICD implant. Case has been previously discussed with Dr. Davis who is agreeable to see patient.   Start Bisoprolol.        Aubrie PRATT scribed this dictation for Dr. Issac Valenzuela.   I have seen and examined the patient, I have reviewed the note, discussed the case with the advance practice clinician, made necessary changes and I agree with the final note.    Issac Valenzuela MD  10/18/24  14:24 EDT            Dictated utilizing Dragon dictation  "

## 2024-10-18 PROBLEM — I42.1 HOCM (HYPERTROPHIC OBSTRUCTIVE CARDIOMYOPATHY): Status: ACTIVE | Noted: 2024-10-18

## 2024-10-18 NOTE — PROGRESS NOTES
"Electrophysiology Clinic Consult     Kelvin Borjas  [id number]  1990    Referring Provider: Aubrie Ramon APRN   PCP: Provider, No Known  [facility] / [place] [id number]    Date of Service: 10/18/24    Chief Complaint   Patient presents with    Cardiomyopathy     Problem List  HOCM  10/2024 cardiac MRI hypertrophic cardiomyopathy with maximum wall thickness of 34 mm in the basal/mid inferior septum.  EF 54%.  No significant LVOT obstruction.  Diffuse heterogeneous enhancement especially in the mid and distal segments involving greater than 15% of the myocardium.  No significant valve abnormalities.  Morbid obesity  Surgeries:  [place] teeth extraction     History of Present Illness  Kelvin Borjas is a 34 y.o. male who presents to my electrophysiology clinic for evaluation of HOCM. Recently had a visit with Dr. Valenzuela and Aubrie Ramon due to syncope and palpitation. Subsequent testing including cMRI showing HOCM with septal thickening of 34mm. Patient also has greater than 15% LGE.     Review of Systems   Constitutional:  Negative for activity change, fatigue and fever.   Respiratory:  Negative for chest tightness and shortness of breath.    Cardiovascular:  Negative for chest pain, palpitations and leg swelling.   Gastrointestinal:  Negative for constipation and diarrhea.   Genitourinary:  Negative for decreased urine volume and difficulty urinating.   Skin:  Negative for wound.   Neurological:  Positive for syncope. Negative for dizziness, weakness and light-headedness.   Psychiatric/Behavioral:  Negative for suicidal ideas.        No outpatient medications have been marked as taking for the 10/17/24 encounter (Office Visit) with Tomi Davis MD.       Physical Exam  Vitals:    10/17/24 1432   BP: 130/82   BP Location: Left arm   Patient Position: Sitting   Cuff Size: Adult   Pulse: 64   SpO2: 94%   Weight: (!) 153 kg (336 lb 9.6 oz)   Height: 188 cm (74\")     Body mass " "index is 43.22 kg/m².    Vitals and nursing note reviewed.   Constitutional:       Appearance: Healthy appearance.   HENT:      Head: Normocephalic and atraumatic.      Nose: Nose normal.   Neck:      Vascular: No JVD.   Pulmonary:      Effort: Pulmonary effort is normal.      Breath sounds: Normal breath sounds.   Cardiovascular:      PMI at left midclavicular line. Normal rate. Regular rhythm. Normal S1. Normal S2.       Murmurs: There is no murmur.      No gallop.    Edema:     Peripheral edema absent.   Skin:     General: Skin is warm and dry.   Neurological:      Mental Status: Oriented to person, place and time.   Psychiatric:         Behavior: Behavior normal.          Diagnostic Data    ECG 12 Lead    Date/Time: 10/18/2024 10:07 AM  Performed by: Tomi Davis MD    Authorized by: Tomi Davis MD  Rhythm: sinus rhythm  Rate: normal  QRS axis: normal  Other findings: non-specific ST-T wave changes and left ventricular hypertrophy with strain    Clinical impression: abnormal EKG          Lab Results   Component Value Date    GLUCOSE 96 08/13/2024    CALCIUM 9.3 08/13/2024     08/13/2024    K 4.2 08/13/2024    CO2 23.0 08/13/2024     08/13/2024    BUN 12 08/13/2024    CREATININE 0.86 08/13/2024    EGFRIFAFRI 112 08/12/2019    BCR 14.0 08/13/2024    ANIONGAP 9.0 08/13/2024     Lab Results   Component Value Date    WBC 7.73 08/13/2024    HGB 15.1 08/13/2024    HCT 44.1 08/13/2024    MCV 83.2 08/13/2024     08/13/2024     No results found for: \"INR\", \"PROTIME\"  Lab Results   Component Value Date    TSH 2.040 08/12/2019         I personally viewed and interpreted the patient's EKG/Telemetry/lab data    Kelvin Borjas  reports that he has never smoked. He has never been exposed to tobacco smoke. He has never used smokeless tobacco. I have educated him on the risk of diseases from using tobacco products such as cancer, COPD, and heart disease.       I spent 3  minutes counseling the " patient.           ACP discussion was declined by the patient. Patient does not have an advance directive, declines further assistance.    Assessment and Plan   Diagnoses and all orders for this visit:    1. HOCM (hypertrophic obstructive cardiomyopathy) (Primary)    Other orders  -     ECG 12 Lead        HOCM  - given septal thickness of 34mm, would recommend primary prevention ICD. He also has LGE of 15% as well as some palpitiation while wearing a monitor (suspect some component of NSVT)- will follow up on his results   - discussed S-ICD vs SC TV ICD   - patient would like to consider his options and discuss with his family before getting signed up. Overall given his young age and lack of need for pacing, would recommend SICD.       Follow Up  Return for Follow up after Procedure.      Thank you for allowing me to participate in the care of your patient. Please to not hesitate to contact me with additional questions or concerns.        Tomi Davis MD [facility]  Cardiac Electrophysiologist  [facility] / [facility]

## 2024-10-22 DIAGNOSIS — I42.1 HOCM (HYPERTROPHIC OBSTRUCTIVE CARDIOMYOPATHY): Primary | ICD-10-CM

## 2024-10-22 RX ORDER — BISOPROLOL FUMARATE 5 MG/1
2.5 TABLET, FILM COATED ORAL DAILY
Qty: 15 TABLET | Refills: 11 | Status: SHIPPED | OUTPATIENT
Start: 2024-10-22

## 2024-10-24 ENCOUNTER — TELEPHONE (OUTPATIENT)
Dept: CARDIOLOGY | Facility: CLINIC | Age: 34
End: 2024-10-24

## 2024-10-24 NOTE — TELEPHONE ENCOUNTER
I tried to call the patient back to f/u but he did not answer. I left a message for him to call me back at the office.

## 2024-10-24 NOTE — TELEPHONE ENCOUNTER
Caller: Kelvin Borjas    Relationship: Self    Best call back number: [phone]     What is the best time to reach you: ANYTIME    Who are you requesting to speak with (clinical staff, provider,  specific staff member): ANYONE      What was the call regarding:  PT HAS SOME QUESTIONS ABOUT HIS UPCOMING PROCEDURES.      Is it okay if the provider responds through [facility]: PLEASE CALL PT, THANK YOU!

## 2024-11-01 ENCOUNTER — PREP FOR SURGERY (OUTPATIENT)
Dept: OTHER | Facility: HOSPITAL | Age: 34
End: 2024-11-01
Payer: COMMERCIAL

## 2024-11-01 DIAGNOSIS — I42.1 HOCM (HYPERTROPHIC OBSTRUCTIVE CARDIOMYOPATHY): Primary | ICD-10-CM

## 2024-11-01 RX ORDER — SODIUM CHLORIDE 0.9 % (FLUSH) 0.9 %
10 SYRINGE (ML) INJECTION EVERY 12 HOURS SCHEDULED
OUTPATIENT
Start: 2024-11-01

## 2024-11-01 RX ORDER — ONDANSETRON 2 MG/ML
4 INJECTION INTRAMUSCULAR; INTRAVENOUS EVERY 6 HOURS PRN
OUTPATIENT
Start: 2024-11-01

## 2024-11-01 RX ORDER — CEFAZOLIN SODIUM 2 G/100ML
2000 INJECTION, SOLUTION INTRAVENOUS ONCE
OUTPATIENT
Start: 2024-11-01 | End: 2024-11-01

## 2024-11-01 RX ORDER — SODIUM CHLORIDE 9 MG/ML
40 INJECTION, SOLUTION INTRAVENOUS AS NEEDED
OUTPATIENT
Start: 2024-11-01

## 2024-11-01 RX ORDER — ACETAMINOPHEN 325 MG/1
650 TABLET ORAL EVERY 4 HOURS PRN
OUTPATIENT
Start: 2024-11-01

## 2024-11-01 RX ORDER — SODIUM CHLORIDE 0.9 % (FLUSH) 0.9 %
1-10 SYRINGE (ML) INJECTION AS NEEDED
OUTPATIENT
Start: 2024-11-01

## 2024-11-01 RX ORDER — NITROGLYCERIN 0.4 MG/1
0.4 TABLET SUBLINGUAL
OUTPATIENT
Start: 2024-11-01

## 2024-11-14 ENCOUNTER — PRE-ADMISSION TESTING (OUTPATIENT)
Dept: PREADMISSION TESTING | Facility: HOSPITAL | Age: 34
End: 2024-11-14
Payer: COMMERCIAL

## 2024-11-14 DIAGNOSIS — I42.1 HOCM (HYPERTROPHIC OBSTRUCTIVE CARDIOMYOPATHY): ICD-10-CM

## 2024-11-14 LAB
ANION GAP SERPL CALCULATED.3IONS-SCNC: 10 MMOL/L (ref 5–15)
BUN SERPL-MCNC: 15 MG/DL (ref 6–20)
BUN/CREAT SERPL: 17.9 (ref 7–25)
CALCIUM SPEC-SCNC: 9.6 MG/DL (ref 8.6–10.5)
CHLORIDE SERPL-SCNC: 107 MMOL/L (ref 98–107)
CO2 SERPL-SCNC: 22 MMOL/L (ref 22–29)
CREAT SERPL-MCNC: 0.84 MG/DL (ref 0.76–1.27)
DEPRECATED RDW RBC AUTO: 39.8 FL (ref 37–54)
EGFRCR SERPLBLD CKD-EPI 2021: 117.4 ML/MIN/1.73
ERYTHROCYTE [DISTWIDTH] IN BLOOD BY AUTOMATED COUNT: 12.6 % (ref 12.3–15.4)
GLUCOSE SERPL-MCNC: 108 MG/DL (ref 65–99)
HCT VFR BLD AUTO: 44.3 % (ref 37.5–51)
HGB BLD-MCNC: 15 G/DL (ref 13–17.7)
MCH RBC QN AUTO: 29.2 PG (ref 26.6–33)
MCHC RBC AUTO-ENTMCNC: 33.9 G/DL (ref 31.5–35.7)
MCV RBC AUTO: 86.4 FL (ref 79–97)
PLATELET # BLD AUTO: 254 10*3/MM3 (ref 140–450)
PMV BLD AUTO: 10.6 FL (ref 6–12)
POTASSIUM SERPL-SCNC: 4 MMOL/L (ref 3.5–5.2)
RBC # BLD AUTO: 5.13 10*6/MM3 (ref 4.14–5.8)
SODIUM SERPL-SCNC: 139 MMOL/L (ref 136–145)
WBC NRBC COR # BLD AUTO: 9.19 10*3/MM3 (ref 3.4–10.8)

## 2024-11-14 PROCEDURE — 36415 COLL VENOUS BLD VENIPUNCTURE: CPT

## 2024-11-14 PROCEDURE — 80048 BASIC METABOLIC PNL TOTAL CA: CPT

## 2024-11-14 PROCEDURE — 85027 COMPLETE CBC AUTOMATED: CPT

## 2024-11-14 NOTE — DISCHARGE INSTRUCTIONS
Dear Patient,    Do NOT eat, drink, or smoke after midnight the night before your procedure.   Take your medications as instructed by your doctor.    Glasses and jewelry may be worn, but dentures must be removed prior to your procedure.    Leave any items you consider valuable at home.      MORNING of your Procedure, please bring the following:     -Photo ID and insurance card(s)    -ALL medications in their ORIGINAL CONTAINERS or current medication list.    -Co-pay and/or deductible required by your insurance   -Copy of living will or power of  document (if not brought to Pre-Admission Testing department)   -CPAP mask and tubing, not your machine (if applicable)   -Relaxation aids (music, books, magazines)   -Skin Prep Instruction Sheet (if applicable)       Check in is on the 2nd floor of the [facility].  Your procedure will be performed in the cath lab or EP lab.  During your procedure, your family will wait in the cath lab waiting area where you checked in.      Need to make arrangements for transportation prior to discharge.    Educational handout related to procedure was given in Pre-Admission testing.  The educational handout is for informational purposes only.  If you have any questions about your procedure, please speak with your physician.      Please note:  If you are scheduled to have one of the following procedures: Pulmonary Vein Ablation, Lead Extraction, MitraClip, Cerebral Coilings or Embolization, please let your family know that after your procedure you will be going to recovery unit on the 2nd floor of the [facility].  When the physician is finished speaking with your family after your procedure is completed, your family will be directed or escorted to the surgery waiting area in the [facility].  This is where your family will wait until you are given a room assignment and then your family will be directed to the appropriate unit.

## 2024-11-14 NOTE — PAT
Patient reported not receiving enough CHG wipes, attempted to call office and left a message, therefor  Patient to apply Chlorhexadine wipes  to surgical area (as instructed) the night before procedure and the AM of procedure. Wipes provided in PAT

## 2024-11-15 ENCOUNTER — PATIENT MESSAGE (OUTPATIENT)
Dept: CARDIOLOGY | Facility: CLINIC | Age: 34
End: 2024-11-15
Payer: COMMERCIAL

## 2024-11-19 ENCOUNTER — ANESTHESIA EVENT CONVERTED (OUTPATIENT)
Dept: ANESTHESIOLOGY | Facility: HOSPITAL | Age: 34
End: 2024-11-19
Payer: COMMERCIAL

## 2024-11-19 ENCOUNTER — HOSPITAL ENCOUNTER (OUTPATIENT)
Facility: HOSPITAL | Age: 34
Setting detail: HOSPITAL OUTPATIENT SURGERY
Discharge: HOME | End: 2024-11-19
Attending: INTERNAL MEDICINE | Admitting: INTERNAL MEDICINE
Payer: COMMERCIAL

## 2024-11-19 ENCOUNTER — ANESTHESIA EVENT (OUTPATIENT)
Dept: CARDIOLOGY | Facility: HOSPITAL | Age: 34
End: 2024-11-19
Payer: COMMERCIAL

## 2024-11-19 ENCOUNTER — ANESTHESIA (OUTPATIENT)
Dept: CARDIOLOGY | Facility: HOSPITAL | Age: 34
End: 2024-11-19
Payer: COMMERCIAL

## 2024-11-19 ENCOUNTER — APPOINTMENT (OUTPATIENT)
Dept: GENERAL RADIOLOGY | Facility: HOSPITAL | Age: 34
End: 2024-11-19
Payer: COMMERCIAL

## 2024-11-19 VITALS
RESPIRATION RATE: 18 BRPM | HEART RATE: 61 BPM | HEIGHT: 75 IN | DIASTOLIC BLOOD PRESSURE: 87 MMHG | OXYGEN SATURATION: 95 % | SYSTOLIC BLOOD PRESSURE: 133 MMHG | WEIGHT: 315 LBS | TEMPERATURE: 98 F | BODY MASS INDEX: 39.17 KG/M2

## 2024-11-19 DIAGNOSIS — I42.1 HOCM (HYPERTROPHIC OBSTRUCTIVE CARDIOMYOPATHY): ICD-10-CM

## 2024-11-19 PROCEDURE — 71046 X-RAY EXAM CHEST 2 VIEWS: CPT

## 2024-11-19 PROCEDURE — 25010000002 LIDOCAINE 1% - EPINEPHRINE 1:100000 1 %-1:100000 SOLUTION: Performed by: INTERNAL MEDICINE

## 2024-11-19 PROCEDURE — 25810000003 SODIUM CHLORIDE 0.9 % SOLUTION: Performed by: INTERNAL MEDICINE

## 2024-11-19 PROCEDURE — C1722 AICD, SINGLE CHAMBER: HCPCS | Performed by: INTERNAL MEDICINE

## 2024-11-19 PROCEDURE — 99152 MOD SED SAME PHYS/QHP 5/>YRS: CPT | Performed by: INTERNAL MEDICINE

## 2024-11-19 PROCEDURE — 33270 INS/REP SUBQ DEFIBRILLATOR: CPT | Performed by: INTERNAL MEDICINE

## 2024-11-19 PROCEDURE — 25010000002 FENTANYL CITRATE (PF) 50 MCG/ML SOLUTION: Performed by: INTERNAL MEDICINE

## 2024-11-19 PROCEDURE — S0260 H&P FOR SURGERY: HCPCS

## 2024-11-19 PROCEDURE — 25010000002 MIDAZOLAM PER 1 MG: Performed by: INTERNAL MEDICINE

## 2024-11-19 PROCEDURE — C1896 LEAD, AICD, NON SING/DUAL: HCPCS | Performed by: INTERNAL MEDICINE

## 2024-11-19 PROCEDURE — 25010000002 CEFAZOLIN PER 500 MG: Performed by: INTERNAL MEDICINE

## 2024-11-19 PROCEDURE — 25010000002 ONDANSETRON PER 1 MG: Performed by: INTERNAL MEDICINE

## 2024-11-19 PROCEDURE — 99153 MOD SED SAME PHYS/QHP EA: CPT | Performed by: INTERNAL MEDICINE

## 2024-11-19 PROCEDURE — 25010000002 CEFAZOLIN 3 G RECONSTITUTED SOLUTION 1 EACH VIAL

## 2024-11-19 DEVICE — SUBCUTANEOUS IMPLANTABLE CARDIOVERTER DEFIBRILLATOR
Type: IMPLANTABLE DEVICE | Site: HEART | Status: FUNCTIONAL
Brand: EMBLEM™ MRI S-ICD

## 2024-11-19 DEVICE — SUBCUTANEOUS ELECTRODE
Type: IMPLANTABLE DEVICE | Site: HEART | Status: FUNCTIONAL
Brand: EMBLEM™ S-ICD

## 2024-11-19 RX ORDER — FENTANYL CITRATE 50 UG/ML
INJECTION, SOLUTION INTRAMUSCULAR; INTRAVENOUS
Status: DISCONTINUED | OUTPATIENT
Start: 2024-11-19 | End: 2024-11-19 | Stop reason: HOSPADM

## 2024-11-19 RX ORDER — ETOMIDATE 2 MG/ML
INJECTION INTRAVENOUS
Status: DISCONTINUED | OUTPATIENT
Start: 2024-11-19 | End: 2024-11-19 | Stop reason: HOSPADM

## 2024-11-19 RX ORDER — SODIUM CHLORIDE 0.9 % (FLUSH) 0.9 %
1-10 SYRINGE (ML) INJECTION AS NEEDED
Status: DISCONTINUED | OUTPATIENT
Start: 2024-11-19 | End: 2024-11-19 | Stop reason: HOSPADM

## 2024-11-19 RX ORDER — NITROGLYCERIN 0.4 MG/1
0.4 TABLET SUBLINGUAL
Status: DISCONTINUED | OUTPATIENT
Start: 2024-11-19 | End: 2024-11-19 | Stop reason: HOSPADM

## 2024-11-19 RX ORDER — ONDANSETRON 2 MG/ML
INJECTION INTRAMUSCULAR; INTRAVENOUS
Status: DISCONTINUED | OUTPATIENT
Start: 2024-11-19 | End: 2024-11-19 | Stop reason: HOSPADM

## 2024-11-19 RX ORDER — CEFAZOLIN SODIUM 2 G/100ML
2000 INJECTION, SOLUTION INTRAVENOUS ONCE
Status: DISCONTINUED | OUTPATIENT
Start: 2024-11-19 | End: 2024-11-19

## 2024-11-19 RX ORDER — ONDANSETRON 2 MG/ML
4 INJECTION INTRAMUSCULAR; INTRAVENOUS EVERY 6 HOURS PRN
Status: DISCONTINUED | OUTPATIENT
Start: 2024-11-19 | End: 2024-11-19 | Stop reason: HOSPADM

## 2024-11-19 RX ORDER — FENTANYL CITRATE 50 UG/ML
INJECTION, SOLUTION INTRAMUSCULAR; INTRAVENOUS
Status: COMPLETED | OUTPATIENT
Start: 2024-11-19 | End: 2024-11-19

## 2024-11-19 RX ORDER — DEXAMETHASONE SODIUM PHOSPHATE 10 MG/ML
INJECTION, SOLUTION INTRAMUSCULAR; INTRAVENOUS
Status: COMPLETED | OUTPATIENT
Start: 2024-11-19 | End: 2024-11-19

## 2024-11-19 RX ORDER — SODIUM CHLORIDE 0.9 % (FLUSH) 0.9 %
10 SYRINGE (ML) INJECTION EVERY 12 HOURS SCHEDULED
Status: DISCONTINUED | OUTPATIENT
Start: 2024-11-19 | End: 2024-11-19 | Stop reason: HOSPADM

## 2024-11-19 RX ORDER — SODIUM CHLORIDE 0.9 % (FLUSH) 0.9 %
10 SYRINGE (ML) INJECTION AS NEEDED
Status: DISCONTINUED | OUTPATIENT
Start: 2024-11-19 | End: 2024-11-19 | Stop reason: HOSPADM

## 2024-11-19 RX ORDER — MIDAZOLAM HYDROCHLORIDE 1 MG/ML
INJECTION, SOLUTION INTRAMUSCULAR; INTRAVENOUS
Status: DISCONTINUED | OUTPATIENT
Start: 2024-11-19 | End: 2024-11-19 | Stop reason: HOSPADM

## 2024-11-19 RX ORDER — LIDOCAINE HYDROCHLORIDE AND EPINEPHRINE 10; 10 MG/ML; UG/ML
INJECTION, SOLUTION INFILTRATION; PERINEURAL
Status: DISCONTINUED | OUTPATIENT
Start: 2024-11-19 | End: 2024-11-19 | Stop reason: HOSPADM

## 2024-11-19 RX ORDER — SODIUM CHLORIDE 9 MG/ML
INJECTION, SOLUTION INTRAVENOUS
Status: DISCONTINUED | OUTPATIENT
Start: 2024-11-19 | End: 2024-11-19 | Stop reason: HOSPADM

## 2024-11-19 RX ORDER — SODIUM CHLORIDE 9 MG/ML
40 INJECTION, SOLUTION INTRAVENOUS AS NEEDED
Status: DISCONTINUED | OUTPATIENT
Start: 2024-11-19 | End: 2024-11-19 | Stop reason: HOSPADM

## 2024-11-19 RX ORDER — MIDAZOLAM HYDROCHLORIDE 1 MG/ML
INJECTION, SOLUTION INTRAMUSCULAR; INTRAVENOUS
Status: COMPLETED | OUTPATIENT
Start: 2024-11-19 | End: 2024-11-19

## 2024-11-19 RX ORDER — ROPIVACAINE HYDROCHLORIDE 5 MG/ML
INJECTION, SOLUTION EPIDURAL; INFILTRATION; PERINEURAL
Status: COMPLETED | OUTPATIENT
Start: 2024-11-19 | End: 2024-11-19

## 2024-11-19 RX ORDER — ACETAMINOPHEN 325 MG/1
650 TABLET ORAL EVERY 4 HOURS PRN
Status: DISCONTINUED | OUTPATIENT
Start: 2024-11-19 | End: 2024-11-19 | Stop reason: HOSPADM

## 2024-11-19 RX ADMIN — DEXAMETHASONE SODIUM PHOSPHATE 2 MG: 10 INJECTION, SOLUTION INTRAMUSCULAR; INTRAVENOUS at 14:25

## 2024-11-19 RX ADMIN — SODIUM CHLORIDE 3000 MG: 900 INJECTION INTRAVENOUS at 15:23

## 2024-11-19 RX ADMIN — ROPIVACAINE HYDROCHLORIDE 30 ML: 5 INJECTION, SOLUTION EPIDURAL; INFILTRATION; PERINEURAL at 14:25

## 2024-11-19 RX ADMIN — DEXAMETHASONE SODIUM PHOSPHATE 2 MG: 10 INJECTION, SOLUTION INTRAMUSCULAR; INTRAVENOUS at 14:22

## 2024-11-19 RX ADMIN — FENTANYL CITRATE 100 MCG: 50 INJECTION, SOLUTION INTRAMUSCULAR; INTRAVENOUS at 14:19

## 2024-11-19 RX ADMIN — MIDAZOLAM HYDROCHLORIDE 2 MG: 1 INJECTION, SOLUTION INTRAMUSCULAR; INTRAVENOUS at 14:19

## 2024-11-19 RX ADMIN — ROPIVACAINE HYDROCHLORIDE 30 ML: 5 INJECTION, SOLUTION EPIDURAL; INFILTRATION; PERINEURAL at 14:22

## 2024-11-19 NOTE — ANESTHESIA PROCEDURE NOTES
Peripheral Block      Patient reassessed immediately prior to procedure    Start time: 11/19/2024 2:23 PM  Stop time: 11/19/2024 2:25 PM  Reason for block: at surgeon's request and post-op pain management  Performed by  CRNA/CAA: Abhi Queen, [place]  Assisted by: Shabnam Raphael RNSRNA: Pineda Mulligan CRNA  Preanesthetic Checklist  Completed: patient identified, IV checked, site marked, risks and benefits discussed, surgical consent, monitors and equipment checked, pre-op evaluation and timeout performed  Prep:  Pt Position: supine  Sterile barriers:gloves, mask, washed/disinfected hands and cap  Prep: alcohol swabs and ChloraPrep  Patient monitoring: blood pressure monitoring, continuous pulse oximetry and EKG  Procedure  Performed under: local infiltration  Guidance:ultrasound guided    ULTRASOUND INTERPRETATION.  Using ultrasound guidance a 20 G gauge needle was placed in close proximity to the nerve, at which point, under ultrasound guidance anesthetic was injected in the area of the nerve and spread of the anesthesia was seen on ultrasound in close proximity thereto.  There were no abnormalities seen on ultrasound; a digital image was taken; and the patient tolerated the procedure with no complications. Images:still images obtained, printed/placed on chart    Laterality:left  Anesthesia block type: Serratus Plane.  Injection Technique:single-shot  Needle Type:echogenic and short-bevel  Needle Gauge:20 G  Resistance on Injection: none    Medications Used: dexamethasone sodium phosphate injection - Injection   2 mg - 11/19/2024 2:25:00 PM  ropivacaine (NAROPIN) 0.5 % injection - Injection   30 mL - 11/19/2024 2:25:00 PM      Post Assessment  Injection Assessment: negative aspiration for heme, no paresthesia on injection and incremental injection  Patient Tolerance:comfortable throughout block  Complications:no  Additional Notes  A high-frequency linear transducer, with sterile cover, was placed in the  "patient's midaxillary line in the transverse plane at the level of the fifth rib. The rib, pleural line, and overlying serratus anterior and latissimus dorsi muscles were visualized. The insertion site was prepped in sterile fashion and then localized with 2-5 ml of 1% Lidocaine. Using ultrasound-guidance, a 20-gauge B-Champagne 4\" Ultraplex 360 non-stimulating echogenic needle was advanced in-plane, caudad to cephalad, at an angle of approximately 45 degrees towards the fifth rib. Once the needle tip was observed in the appropriate plane, anterior to the fifth rib and deep to the serratus anterior muscle, 1 to 3 mL of preservative-free normal saline was injected to confirm needle placement, and to hydro-dissect fascial layers. After opening the fascial plane, the local anesthetic was injected in 3-5 ml aliquots. Aspiration every 5 ml to prevent intravascular injection. Injection was completed with negative aspiration of blood and negative intravascular injection. Injection pressures were normal with minimal resistance.   Performed by: Abhi Queen, CRNA            "

## 2024-11-19 NOTE — H&P
[facility] at [facility]  HISTORY AND PHYSICAL    Kelvin Borjas  : 1990  MRN:[id number]    Date of Admission:2024    PCP: Provider, No Known    Chief Complaint: HOCM    PROBLEM LIST:   HOCM  10/2024 cardiac MRI hypertrophic cardiomyopathy with maximum wall thickness of 34 mm in the basal/mid inferior septum.  EF 54%.  No significant LVOT obstruction.  Diffuse heterogeneous enhancement especially in the mid and distal segments involving greater than 15% of the myocardium.  No significant valve abnormalities.  Morbid obesity  Surgeries:  [place] teeth extraction    ALLERGIES: No Known Allergies    HOME MEDICINES:   Prior to Admission Medications       Prescriptions Last Dose Informant Patient Reported? Taking?    bisoprolol (ZEBeta) 5 MG tablet  Self No No    Take 0.5 tablets by mouth Daily.            Subjective     HPI: Kelvin Borjas is a 34 y.o. male [facility] today for subcutaneous ICD placement.  Patient has a history of hokum and recently was evaluated for syncope and palpitations.  Cardiac MRI showed septal thickening of 34 mm and also has greater than 15% LGE.  Patient denies chest pain, shortness of breath, lightheadedness, dizziness and syncope.    ROS: All systems have been reviewed and are negative with the exception of those mentioned in the HPI and problem list above.    Past Medical History:   Past Medical History:   Diagnosis Date    Cardiomyopathy     Obesity     Pes planus       Surgical History:   Past Surgical History:   Procedure Laterality Date    WISDOM TOOTH EXTRACTION       Social History:   Social History     Socioeconomic History    Marital status: Single     Spouse name: Etta    Number of children: 2    Years of education: H.S.    Highest education level: High school graduate   Tobacco Use    Smoking status: Never     Passive exposure: Never    Smokeless tobacco: Never   Vaping Use    Vaping status: Never Used  "  Substance and Sexual Activity    Alcohol use: Yes     Alcohol/week: 3.0 standard drinks of alcohol     Types: 3 Shots of liquor per week     Comment: socially     Drug use: Not Currently    Sexual activity: Yes     Partners: Female     Birth control/protection: None     Family History:   Family History   Problem Relation Age of Onset    Hypertension Mother     Hypertension Father     Prostate cancer Father     No Known Problems Brother     Eczema Daughter     Eczema Son     No Known Problems Maternal Grandmother     Heart attack Maternal Grandfather     Ovarian cancer Paternal Grandmother     Breast cancer Paternal Grandmother     Glaucoma Paternal Grandfather        Objective   /75 (BP Location: Right arm, Patient Position: Lying) Comment: 140/78 L arm  Pulse 55   Temp 98 °F (36.7 °C) (Temporal)   Resp 16   Ht 190.5 cm (75\")   Wt (!) 151 kg (332 lb 14.3 oz)   SpO2 97%   BMI 41.61 kg/m²   No intake or output data in the 24 hours ending 11/19/24 1313    PHYSICAL EXAM:  CONSTITUTIONAL: Well nourished, cooperative, in no acute distress  HEENT: Normocephalic, atraumatic, PERRLA, no JVD  CARDIOVASCULAR:  Regular rhythm and normal rate, no murmur, gallop, rub.   RESPIRATORY: Clear to auscultation, normal respiratory effort, no wheezing, rales or ronchi, on RA  EXTREMITIES: No gross deformities, no edema. Peripheral pulses are present and equal bilaterally  SKIN: Warm, dry. No bleeding, bruising or rash  NEUROLOGICAL: No focal deficits  PSYCHIATRIC: Normal mood and affect. Behavior is normal     Labs/Diagnostic Data  Results from last 7 days   Lab Units 11/14/24  0921   SODIUM mmol/L 139   POTASSIUM mmol/L 4.0   CHLORIDE mmol/L 107   CO2 mmol/L 22.0   BUN mg/dL 15   CREATININE mg/dL 0.84   GLUCOSE mg/dL 108*   CALCIUM mg/dL 9.6         Results from last 7 days   Lab Units 11/14/24  0921   WBC 10*3/mm3 9.19   HEMOGLOBIN g/dL 15.0   HEMATOCRIT % 44.3   PLATELETS 10*3/mm3 254                           "     EKG/Telemetry: SR    Radiology Data:   No radiology results for the last day   Results for orders placed during the hospital encounter of 09/24/24    Adult Transthoracic Echo Complete W/ Cont if Necessary Per Protocol    Interpretation Summary    Left ventricular ejection fraction appears to be greater than 70%.    Left ventricular wall thickness is consistent with septal asymmetric hypertrophy.    The findings are consistent with obstructive, hypertrophic cardiomyopathy.    Systolic anterior motion of the chordal apparatus is present. A gradient is present at rest.    Left ventricular outflow tract peak flow gradient at rest is 24 mmHg. Left ventricular outflow tract peak gradient with valsalva is 48 mmHg.    No hemodynamically significant valvular heart disease       Current Medications:  ceFAZolin, 3,000 mg, Intravenous, Once  sodium chloride, 10 mL, Intravenous, Q12H             Assessment:     HOCM  Syncopal episode with palpitations (NSVT on monitor)  MRI shows septal thickness at 34 mm and 15% LGE.      Plan:     Patient is here today for subcutaneous ICD placement.  Received nerve block prior to procedure.  He also received 3 g Ancef for surgical prophylaxis.  Procedure, risk, and alternatives have discussed with the patient he is agreeable to proceed.  Further recommendations to follow.    Electronically signed by SANTA Harley, 11/19/24, 12:16 PM EST.     Please note that portions of this note were dictated utilizing Dragon dictation.

## 2024-11-19 NOTE — DISCHARGE INSTRUCTIONS
DR. CHENEY DEVICE IMPLANTATION  Pressure Dressing from device site will be removed the morning after procedure or prior to   discharge if the patient goes home the same day. Patient will have an Aquacel dressing   underneath. Typically, no steri-strips or glue is used. The Aquacel Dressing will be removed at   the wound check appointment in 7-10 days.   Please do not lift more than 10 pounds or raise the affected arm above the shoulder for 6 weeks   after the device was implanted (this does not apply to subcutaneous ICDs).  Avoid activities that involve heavy lifting or rough contact that could result in blows to your   implant site. This allows the incision time to heal.  You may shower the day after your procedure.   Do not apply creams, lotions or powders to the incision.   Please avoid allowing a bra strap or suspenders to lay over the incision until it is completely   healed.   Wear your sling at bedtime for 4 weeks.  No baths, hot tubs or swimming for 4 weeks.   No driving for preferably 4 weeks, but at a minimum 2 weeks.   Call your doctor if you have any swelling, redness or discharge around your incision, notice   anything unusual or unexpected or you develop a fever that does not go away in two to three   days.   Call your doctor if you hear any beeping sounds/vibratory alerts from your device as this   indicates your device needs to be checked immediately.  You will be scheduled for a 7-10 day wound check appointment and a 3 month follow up to   check your device.   Carry your medical device ID card with you at all times.     Please call our office at [phone] with any questions about the device or incision.

## 2024-11-19 NOTE — ANESTHESIA PROCEDURE NOTES
Peripheral Block    Pre-sedation assessment completed: 11/19/2024 1:14 PM    Start time: 11/19/2024 1:14 PM  Performed by: Abhi Queen, CRNA           

## 2024-11-19 NOTE — ANESTHESIA PROCEDURE NOTES
Peripheral Block    Pre-sedation assessment completed: 11/19/2024 1:16 PM    Patient reassessed immediately prior to procedure    Start time: 11/19/2024 2:19 PM  Stop time: 11/19/2024 2:22 PM  Reason for block: at surgeon's request and post-op pain management  Performed by  CRNA/CAA: Abhi Queen CRNA  Assisted by: Shabnam Raphael RNSRNA: Pineda Mulligan CRNA  Preanesthetic Checklist  Completed: patient identified, IV checked, site marked, risks and benefits discussed, surgical consent, monitors and equipment checked, pre-op evaluation and timeout performed  Prep:  Pt Position: supine  Sterile barriers:washed/disinfected hands, gloves, mask and cap  Prep: ChloraPrep  Patient monitoring: blood pressure monitoring, continuous pulse oximetry and EKG  Procedure  Performed under: local infiltration  Guidance:ultrasound guided    ULTRASOUND INTERPRETATION.  Using ultrasound guidance a 20 G gauge needle was placed in close proximity to the nerve, at which point, under ultrasound guidance anesthetic was injected in the area of the nerve and spread of the anesthesia was seen on ultrasound in close proximity thereto.  There were no abnormalities seen on ultrasound; a digital image was taken; and the patient tolerated the procedure with no complications. Images:still images obtained, printed/placed on chart    Laterality:left  Anesthesia block type: Subpectoral Interfascial Plane.  Injection Technique:single-shot  Needle Type:echogenic and short-bevel  Needle Gauge:20 G  Resistance on Injection: none    Medications Used: fentaNYL citrate (PF) (SUBLIMAZE) injection - Intravenous   100 mcg - 11/19/2024 2:19:00 PM  midazolam (VERSED) injection - Intravenous   2 mg - 11/19/2024 2:19:00 PM  dexamethasone sodium phosphate injection - Injection   2 mg - 11/19/2024 2:22:00 PM  ropivacaine (NAROPIN) 0.5 % injection - Injection   30 mL - 11/19/2024 2:22:00 PM      Medications  Preservative Free Saline:5ml    Post  "Assessment  Injection Assessment: negative aspiration for heme, no paresthesia on injection and incremental injection  Patient Tolerance:comfortable throughout block  Complications:no  Additional Notes  A high-frequency linear transducer, with sterile cover, was placed in a parasagittal plane 2 cm lateral to the sternum at the 4th rib. The insertion site was prepped in sterile fashion and then localized with 2-5 ml of 1% Lidocaine. Using ultrasound-guidance, a 20-gauge B-Champagne 4\" Ultraplex 360 non-stimulating echogenic needle was advanced in plane until the tip of the needle is in the fascial plane between the pectoralis major and external intercostal muscle at the level of the 4th rib. 1-3ml of preservative free normal saline was used to hydro-dissect the fascial planes. After the fascial planes were verified, the local anesthetic was injected caudad to cephalad. Another injection was performed at the same level (4th rib) in plane, from cephalad to caudad with the local anesthetic. Aspiration every 5 ml to prevent intravascular injection. Injection was completed with negative aspiration of blood and negative intravascular injection. Injection pressures were normal with minimal resistance. This block can be performed for single sided coverage or bilateral coverage of the sternum.  Performed by: Pineda Mulligan CRNA          "

## 2024-11-20 ENCOUNTER — TELEPHONE (OUTPATIENT)
Dept: CARDIOLOGY | Facility: CLINIC | Age: 34
End: 2024-11-20
Payer: COMMERCIAL

## 2024-11-20 ENCOUNTER — CALL CENTER PROGRAMS (OUTPATIENT)
Dept: CALL CENTER | Facility: HOSPITAL | Age: 34
End: 2024-11-20
Payer: COMMERCIAL

## 2024-11-20 NOTE — OUTREACH NOTE
PCI/Device Survey      Flowsheet Row Responses   Facility patient discharged from? [facility]   Procedure date 11/19/24   Procedure (if device, specify in description) Device   Device Description subcutaneous implantable cardioverter-defibrillator system.   Performing MD Other (annotate)  [Dr. Davis]   Attempt successful? No   Unsuccessful attempts Attempt 1            Jeannette MARTIN - Registered Nurse

## 2024-11-20 NOTE — OUTREACH NOTE
PCI/Device Survey      Flowsheet Row Responses   Facility patient discharged from? [facility]   Procedure date 11/19/24   Procedure (if device, specify in description) Device   Device Description subcutaneous implantable cardioverter-defibrillator system.   Performing MD Other (annotate)   Attempt successful? No   Unsuccessful attempts Attempt 2            Jeannette MARTIN - Registered Nurse

## 2024-11-20 NOTE — TELEPHONE ENCOUNTER
Patient called and stated that he was concerned with drainage on the dressing. Diana PRATT spoke with patient and the drainage is contained in the dressing. Patient will estefanía drainage and call if it gets worse.

## 2024-11-26 ENCOUNTER — OFFICE VISIT (OUTPATIENT)
Dept: CARDIOLOGY | Facility: CLINIC | Age: 34
End: 2024-11-26
Payer: COMMERCIAL

## 2024-11-26 DIAGNOSIS — I42.1 HOCM (HYPERTROPHIC OBSTRUCTIVE CARDIOMYOPATHY): Primary | ICD-10-CM

## 2024-12-02 ENCOUNTER — TELEPHONE (OUTPATIENT)
Dept: CARDIOLOGY | Facility: CLINIC | Age: 34
End: 2024-12-02
Payer: COMMERCIAL

## 2024-12-02 NOTE — TELEPHONE ENCOUNTER
Patient had subcutaneous ICD on 11/19/24. He called and would like to know if he can go back to work next week without restrictions.

## 2024-12-02 NOTE — TELEPHONE ENCOUNTER
Patient left VM stating he has a rash from adhesive. Attempted to call patient back with no answer. Left VM to call us back at the office.

## 2024-12-06 ENCOUNTER — TELEPHONE (OUTPATIENT)
Dept: CARDIOLOGY | Facility: CLINIC | Age: 34
End: 2024-12-06
Payer: COMMERCIAL

## 2024-12-16 ENCOUNTER — OFFICE VISIT (OUTPATIENT)
Dept: SLEEP MEDICINE | Age: 34
End: 2024-12-16
Payer: COMMERCIAL

## 2024-12-16 VITALS
WEIGHT: 315 LBS | OXYGEN SATURATION: 97 % | TEMPERATURE: 98.9 F | DIASTOLIC BLOOD PRESSURE: 80 MMHG | HEART RATE: 65 BPM | HEIGHT: 75 IN | SYSTOLIC BLOOD PRESSURE: 132 MMHG | BODY MASS INDEX: 39.17 KG/M2

## 2024-12-16 DIAGNOSIS — G47.19 EXCESSIVE DAYTIME SLEEPINESS: ICD-10-CM

## 2024-12-16 DIAGNOSIS — G47.33 OBSTRUCTIVE SLEEP APNEA, ADULT: ICD-10-CM

## 2024-12-16 DIAGNOSIS — R06.83 SNORING: ICD-10-CM

## 2024-12-16 DIAGNOSIS — E66.01 OBESITY, MORBID, BMI 40.0-49.9: Primary | ICD-10-CM

## 2024-12-16 PROCEDURE — 99204 OFFICE O/P NEW MOD 45 MIN: CPT | Performed by: INTERNAL MEDICINE

## 2024-12-16 NOTE — PROGRESS NOTES
"  Kelvin Borjas is a 34 y.o. male.   Chief Complaint   Patient presents with    New Patient    Snoring    gasping for air    Dry Mouth    Leg/body Jerks During Sleep       HPI     34 y.o. male seen in consultation at the request of Issac Valenzuela MD for evaluation of the above.     He describes primarily being observed to snore loudly and have apneas by his bed partner.  For a while earlier in the year he was waking up 1 or 2 times per night with choking and difficulty breathing though this seems to have resolved.  He does have a dry mouth or sore throat in the morning    From the standpoint of his daytime functionality he tends to get sleepy towards the end of his day.  He typically works night shift and is at work from around 5 PM until 5 AM and he works for 5 days a week.  He typically sleeps from around 9 AM until 4 PM.  Sometimes he alters this a bit if he has his kids for the day.    [place] Scale is: 6/24    The patient's relevant past medical, surgical, family, and social history reviewed and updated in Epic as appropriate.    Current medications are:   Current Outpatient Medications:     bisoprolol (ZEBeta) 5 MG tablet, Take 0.5 tablets by mouth Daily., Disp: 15 tablet, Rfl: 11.    Review of Systems    Review of Systems  ROS documented in patient questionnaire ×14 systems.  Reviewed with patient.  Otherwise negative except as noted in HPI.    Physical Exam    Blood pressure 132/80, pulse 65, temperature 98.9 °F (37.2 °C), temperature source Oral, height 190.5 cm (75\"), weight (!) 152 kg (334 lb), SpO2 97%. Body mass index is 41.75 kg/m².    Physical Exam  Vitals and nursing note reviewed.   Constitutional:       Appearance: Normal appearance. He is well-developed.   HENT:      Head: Normocephalic and atraumatic.      Nose: Nose normal.      Mouth/Throat:      Mouth: Mucous membranes are moist.      Pharynx: Oropharynx is clear. No oropharyngeal exudate.      Comments: Class I airway  Eyes:      " General: No scleral icterus.     Conjunctiva/sclera: Conjunctivae normal.   Neck:      Thyroid: No thyromegaly.      Trachea: No tracheal deviation.   Cardiovascular:      Rate and Rhythm: Normal rate and regular rhythm.      Heart sounds: No murmur heard.     No friction rub. No gallop.   Pulmonary:      Effort: Pulmonary effort is normal. No respiratory distress.      Breath sounds: No wheezing or rales.   Musculoskeletal:         General: No deformity. Normal range of motion.   Skin:     General: Skin is warm and dry.      Findings: No rash.   Neurological:      Mental Status: He is alert and oriented to person, place, and time.   Psychiatric:         Behavior: Behavior normal.         Thought Content: Thought content normal.         DATA:    Reviewed 9/16/2024 note from Dr. Valenzuela    Reviewed 9/24/2024 echocardiogram results consistent with HCM    Reviewed bed partner questionnaire    ASSESSMENT:    Problem List Items Addressed This Visit    None  Visit Diagnoses       Obesity, morbid, BMI 40.0-49.9    -  Primary    Obstructive sleep apnea, adult        Relevant Orders    Home Sleep Study    Snoring        Relevant Orders    Home Sleep Study    Excessive daytime sleepiness        Relevant Orders    Home Sleep Study            34-year-old male who presents to the sleep center with snoring, witnessed apneas, and excessive daytime somnolence.  He typically works night shift though typically will keep a regular schedule throughout the week sleeping during the day.  He thinks he averages 8 hours of sleep in a 24-hour period.  His elevated BMI puts him at increased risk for sleep sort of breathing.    I think the likely sleep diagnosis is obstructive sleep apnea.  I suggested we proceed with a home sleep apnea test initially before considering more extensive diagnostic testing as this will likely be diagnostic of CASH is present.  He was interested in proceeding as recommended.    PLAN:    - Home sleep apnea  testing.  - Diagnostic process and potential treatment options discussed and questions/concerns addressed.  - Long-term cardiovascular and metabolic risks of untreated obstructive sleep apnea reviewed with the patient.  - The importance of long-term healthy weight loss discussed.  - Patient was agreeable to a trial of CPAP therapy on an initial trial basis if recommended after testing complete.  - Sleep center follow-up.    I have reviewed the results of my evaluation and impression and discussed my recommendations in detail with the patient.    Signed by  Jerry Ferrari MD    December 16, 2024      CC: Provider, No Known          Issac Valenzuela MD

## 2024-12-30 NOTE — PROGRESS NOTES
2024    Kelvin Borjas, : 1990      Fever: No    Temperature if indicated:     Wound Location: Left Infraclavicular    Dressing Removed: Removed by MA/RN      Old Dressing Appearance:  Clean, dry    Wound Appearance: Redness []                  Drainage []                  Culture obtained []        Color: N/A     Consistency:        Amount: none         Gloves used, wound cleansed with sterile 4x4 and peroxide [x]       MD notified []     MD orders:     Antibiotic started []      If checked, type     Other:       Appointment for follow-up scheduled for 3 months post procedure []    Future Appointments   Date Time Provider Department Center   2025  9:40 AM [facility]  [facility]   2025  1:45 PM Tomi Davis MD [facility]           Jacqui Jameson CMA, 24      MD Signature:______________________________ Completed By/Date:

## 2025-01-14 ENCOUNTER — TELEPHONE (OUTPATIENT)
Dept: CARDIOLOGY | Facility: CLINIC | Age: 35
End: 2025-01-14
Payer: COMMERCIAL

## 2025-01-14 NOTE — TELEPHONE ENCOUNTER
I tried to call the patient to f/u but he did not answer. I received disability paperwork from [employer] and I was just trying to figure out why we received another set of paperwork to complete? Dr. Davis already filled the same paperwork out in December.

## 2025-01-17 ENCOUNTER — HOSPITAL ENCOUNTER (OUTPATIENT)
Dept: SLEEP MEDICINE | Facility: HOSPITAL | Age: 35
Discharge: HOME | End: 2025-01-17
Admitting: INTERNAL MEDICINE
Payer: COMMERCIAL

## 2025-01-17 VITALS — WEIGHT: 315 LBS | BODY MASS INDEX: 39.17 KG/M2 | HEIGHT: 75 IN

## 2025-01-17 DIAGNOSIS — R06.83 SNORING: ICD-10-CM

## 2025-01-17 DIAGNOSIS — G47.19 EXCESSIVE DAYTIME SLEEPINESS: ICD-10-CM

## 2025-01-17 DIAGNOSIS — G47.33 OBSTRUCTIVE SLEEP APNEA, ADULT: ICD-10-CM

## 2025-01-17 PROCEDURE — 95800 SLP STDY UNATTENDED: CPT

## 2025-01-21 DIAGNOSIS — G47.33 OSA (OBSTRUCTIVE SLEEP APNEA): Primary | ICD-10-CM

## 2025-02-20 ENCOUNTER — OFFICE VISIT (OUTPATIENT)
Dept: CARDIOLOGY | Facility: CLINIC | Age: 35
End: 2025-02-20
Payer: COMMERCIAL

## 2025-02-20 VITALS
WEIGHT: 315 LBS | DIASTOLIC BLOOD PRESSURE: 70 MMHG | BODY MASS INDEX: 39.17 KG/M2 | SYSTOLIC BLOOD PRESSURE: 118 MMHG | HEART RATE: 83 BPM | HEIGHT: 75 IN | OXYGEN SATURATION: 98 %

## 2025-02-20 DIAGNOSIS — I42.1 HOCM (HYPERTROPHIC OBSTRUCTIVE CARDIOMYOPATHY): Primary | Chronic | ICD-10-CM

## 2025-02-26 NOTE — PROGRESS NOTES
"Kelvin Borjas  [id number]  1990  [phone]      02/20/2025      [facility] CARDIOLOGY     Referring Provider: No ref. provider found     Provider, No Known  Psychiatric [id number]    Chief Complaint   Patient presents with    Cardiomyopathy       Problem List  HOCM  10/2024 cardiac MRI hypertrophic cardiomyopathy with maximum wall thickness of 34 mm in the basal/mid inferior septum.  EF 54%.  No significant LVOT obstruction.  Diffuse heterogeneous enhancement especially in the mid and distal segments involving greater than 15% of the myocardium.  No significant valve abnormalities.  Morbid obesity  Surgeries:  [place] teeth extraction      History of Present Illness   Kelvin Borjas is a 34 y.o. male who presents to my electrophysiology clinic for follow up of HOCM s/p subcutaneous ICD.  The procedure, patient has been doing well from a cardiac standpoint.  He still has some soreness at his incision sites which is improving with time.  Sites are well-healed.  He has been able to resume work.    No outpatient medications have been marked as taking for the 2/20/25 encounter (Office Visit) with Diana Smith APRN.            Physical Exam  Vitals:    02/20/25 1355   BP: 118/70   Pulse: 83   SpO2: 98%   Weight: (!) 157 kg (346 lb 3.2 oz)   Height: 190.5 cm (75\")     Body mass index is 43.27 kg/m².  Constitutional:       Appearance: Healthy appearance.   Neck:      Vascular: JVD normal.   Pulmonary:      Effort: Pulmonary effort is normal.      Breath sounds: Normal breath sounds.   Cardiovascular:      Normal rate. Regular rhythm. Normal S1. Normal S2.       Murmurs: There is no murmur.      No gallop.  No rub.   Pulses:     Intact distal pulses.   Edema:     Peripheral edema absent.   Neurological:      General: No focal deficit present.          Diagnostic Data  Procedures    Lab Results   Component Value Date    GLUCOSE 108 (H) 11/14/2024    CALCIUM 9.6 " "11/14/2024     11/14/2024    K 4.0 11/14/2024    CO2 22.0 11/14/2024     11/14/2024    BUN 15 11/14/2024    CREATININE 0.84 11/14/2024    EGFRIFAFRI 112 08/12/2019    BCR 17.9 11/14/2024    ANIONGAP 10.0 11/14/2024     Lab Results   Component Value Date    WBC 9.19 11/14/2024    HGB 15.0 11/14/2024    HCT 44.3 11/14/2024    MCV 86.4 11/14/2024     11/14/2024     No results found for: \"INR\", \"PROTIME\"  Lab Results   Component Value Date    TSH 2.040 08/12/2019       I personally viewed and interpreted the patient's EKG/Telemetry/lab data    Kelvin Valentino [place]  reports that he has never smoked. He has never been exposed to tobacco smoke. He has never used smokeless tobacco. I have educated him on the risk of diseases from using tobacco products such as cancer, COPD, and heart disease.       Assessment and Plan  Diagnoses and all orders for this visit:    1. HOCM (hypertrophic obstructive cardiomyopathy) (Primary)      HOCM  -Syncopal episode with palpitations (NSVT on monitor)  -MRI shows septal thickness at 34 mm and 15% LGE.  -S/p BSC subcutaneous ICD, 11/19/2024  -Device functioning appropriately.  Battery life 98%, system impedance 70 ohms    Follow-Up  Return in about 1 year (around 2/20/2026) for with BSC device check.      Thank you for allowing me to participate in the care of your patient. Please to not hesitate to contact me with additional questions or concerns.     Diana Smith, SANTA      "

## 2025-04-30 ENCOUNTER — TELEPHONE (OUTPATIENT)
Dept: CARDIOLOGY | Facility: CLINIC | Age: 35
End: 2025-04-30
Payer: COMMERCIAL

## 2025-04-30 NOTE — TELEPHONE ENCOUNTER
called this morning c/o about left shoulder pain. He states since having his S-ICD he has been experiencing left shoulder pain that seems to be getting worse. He sleeps on his left side most of the time and is awakened at night due to the pain. He states he can put a pillow under his arm and he gets relieve for a little while but it wakes him up again. He is asking if you think he needs some physical therapy or to see his PCP?

## 2025-04-30 NOTE — TELEPHONE ENCOUNTER
Called to relay message to Mr Borjas and there was answer. I left a voice mail relaying instructions from Dr Davis along with my contact information if he needs to call me back.

## 2025-05-12 ENCOUNTER — OFFICE VISIT (OUTPATIENT)
Age: 35
End: 2025-05-12
Payer: COMMERCIAL

## 2025-05-12 ENCOUNTER — LAB (OUTPATIENT)
Age: 35
End: 2025-05-12
Payer: COMMERCIAL

## 2025-05-12 VITALS
DIASTOLIC BLOOD PRESSURE: 68 MMHG | OXYGEN SATURATION: 96 % | HEIGHT: 74 IN | HEART RATE: 68 BPM | SYSTOLIC BLOOD PRESSURE: 124 MMHG | BODY MASS INDEX: 40.43 KG/M2 | WEIGHT: 315 LBS

## 2025-05-12 DIAGNOSIS — I42.1 HOCM (HYPERTROPHIC OBSTRUCTIVE CARDIOMYOPATHY): ICD-10-CM

## 2025-05-12 DIAGNOSIS — T79.2XXA SEROMA, POST-TRAUMATIC: ICD-10-CM

## 2025-05-12 DIAGNOSIS — T81.89XA PROBLEM INVOLVING SURGICAL INCISION: ICD-10-CM

## 2025-05-12 DIAGNOSIS — T79.2XXA SEROMA, POST-TRAUMATIC: Primary | ICD-10-CM

## 2025-05-12 LAB
ALBUMIN SERPL-MCNC: 3.7 G/DL (ref 3.5–5.2)
ALBUMIN/GLOB SERPL: 1.1 G/DL
ALP SERPL-CCNC: 62 U/L (ref 39–117)
ALT SERPL W P-5'-P-CCNC: 16 U/L (ref 1–41)
ANION GAP SERPL CALCULATED.3IONS-SCNC: 6.7 MMOL/L (ref 5–15)
AST SERPL-CCNC: 24 U/L (ref 1–40)
BASOPHILS # BLD AUTO: 0.04 10*3/MM3 (ref 0–0.2)
BASOPHILS NFR BLD AUTO: 0.4 % (ref 0–1.5)
BILIRUB SERPL-MCNC: 0.4 MG/DL (ref 0–1.2)
BUN SERPL-MCNC: 2 MG/DL (ref 6–20)
BUN/CREAT SERPL: 1.9 (ref 7–25)
CALCIUM SPEC-SCNC: 9.2 MG/DL (ref 8.6–10.5)
CHLORIDE SERPL-SCNC: 109 MMOL/L (ref 98–107)
CO2 SERPL-SCNC: 21.3 MMOL/L (ref 22–29)
CREAT SERPL-MCNC: 1.07 MG/DL (ref 0.76–1.27)
CRP SERPL-MCNC: 1.71 MG/DL (ref 0–0.5)
DEPRECATED RDW RBC AUTO: 41 FL (ref 37–54)
EGFRCR SERPLBLD CKD-EPI 2021: 93.4 ML/MIN/1.73
EOSINOPHIL # BLD AUTO: 0.39 10*3/MM3 (ref 0–0.4)
EOSINOPHIL NFR BLD AUTO: 4.2 % (ref 0.3–6.2)
ERYTHROCYTE [DISTWIDTH] IN BLOOD BY AUTOMATED COUNT: 13.3 % (ref 12.3–15.4)
GLOBULIN UR ELPH-MCNC: 3.3 GM/DL
GLUCOSE SERPL-MCNC: 108 MG/DL (ref 65–99)
HCT VFR BLD AUTO: 44.9 % (ref 37.5–51)
HGB BLD-MCNC: 15.2 G/DL (ref 13–17.7)
IMM GRANULOCYTES # BLD AUTO: 0.03 10*3/MM3 (ref 0–0.05)
IMM GRANULOCYTES NFR BLD AUTO: 0.3 % (ref 0–0.5)
LYMPHOCYTES # BLD AUTO: 2.33 10*3/MM3 (ref 0.7–3.1)
LYMPHOCYTES NFR BLD AUTO: 25.4 % (ref 19.6–45.3)
MCH RBC QN AUTO: 28.8 PG (ref 26.6–33)
MCHC RBC AUTO-ENTMCNC: 33.9 G/DL (ref 31.5–35.7)
MCV RBC AUTO: 85 FL (ref 79–97)
MONOCYTES # BLD AUTO: 0.85 10*3/MM3 (ref 0.1–0.9)
MONOCYTES NFR BLD AUTO: 9.2 % (ref 5–12)
NEUTROPHILS NFR BLD AUTO: 5.55 10*3/MM3 (ref 1.7–7)
NEUTROPHILS NFR BLD AUTO: 60.5 % (ref 42.7–76)
NRBC BLD AUTO-RTO: 0 /100 WBC (ref 0–0.2)
PLATELET # BLD AUTO: 312 10*3/MM3 (ref 140–450)
PMV BLD AUTO: 11 FL (ref 6–12)
POTASSIUM SERPL-SCNC: 4.3 MMOL/L (ref 3.5–5.2)
PROT SERPL-MCNC: 7 G/DL (ref 6–8.5)
RBC # BLD AUTO: 5.28 10*6/MM3 (ref 4.14–5.8)
SODIUM SERPL-SCNC: 137 MMOL/L (ref 136–145)
WBC NRBC COR # BLD AUTO: 9.19 10*3/MM3 (ref 3.4–10.8)

## 2025-05-12 PROCEDURE — 90715 TDAP VACCINE 7 YRS/> IM: CPT | Performed by: STUDENT IN AN ORGANIZED HEALTH CARE EDUCATION/TRAINING PROGRAM

## 2025-05-12 PROCEDURE — 86140 C-REACTIVE PROTEIN: CPT | Performed by: STUDENT IN AN ORGANIZED HEALTH CARE EDUCATION/TRAINING PROGRAM

## 2025-05-12 PROCEDURE — 80053 COMPREHEN METABOLIC PANEL: CPT | Performed by: STUDENT IN AN ORGANIZED HEALTH CARE EDUCATION/TRAINING PROGRAM

## 2025-05-12 PROCEDURE — 36415 COLL VENOUS BLD VENIPUNCTURE: CPT | Performed by: STUDENT IN AN ORGANIZED HEALTH CARE EDUCATION/TRAINING PROGRAM

## 2025-05-12 PROCEDURE — 85025 COMPLETE CBC W/AUTO DIFF WBC: CPT | Performed by: STUDENT IN AN ORGANIZED HEALTH CARE EDUCATION/TRAINING PROGRAM

## 2025-05-12 PROCEDURE — 90471 IMMUNIZATION ADMIN: CPT | Performed by: STUDENT IN AN ORGANIZED HEALTH CARE EDUCATION/TRAINING PROGRAM

## 2025-05-12 PROCEDURE — 99204 OFFICE O/P NEW MOD 45 MIN: CPT | Performed by: STUDENT IN AN ORGANIZED HEALTH CARE EDUCATION/TRAINING PROGRAM

## 2025-05-12 NOTE — PROGRESS NOTES
Office Note     Name: Kelvin Borjas    : 1990     MRN: [id number]     Chief Complaint  Establish Care, Incisional Pain (Bubbled up), and left shoulder (Pain and discomfort)    Subjective     History of Present Illness:  Kelvin Borjas is a 34 y.o. male who presents today for Initial visit to establish care.  He has HOCM s/p ICD implantation.  Since that time he has noted left shoulder pain.  He also notes a bulge at his anterior incision site which is enlarging.  No significant pain or other concerns.  No fevers or systemic symptoms    Past Medical History:   Past Medical History:   Diagnosis Date    Cardiomyopathy     Obesity     Pes planus        Past Surgical History:   Past Surgical History:   Procedure Laterality Date    ICD INSERTION N/A 2024    Procedure: SICD Implant (BSC), no meds to hold, nerve block;  Surgeon: Tomi Davis MD;  Location: [facility] INVASIVE LOCATION;  Service: Cardiovascular;  Laterality: N/A;  Nerve block    WISDOM TOOTH EXTRACTION         Immunizations:   Immunization History   Administered Date(s) Administered    Hep B, Adolescent or Pediatric 2001, 2001, 2002    MMR 1997    Tdap 2014, 2025        Medications:     Current Outpatient Medications:     bisoprolol (ZEBeta) 5 MG tablet, Take 0.5 tablets by mouth Daily., Disp: 15 tablet, Rfl: 11    Allergies:   Allergies   Allergen Reactions    Aquacel Extra Hydrofiber [Wound Dressings] Itching     Rash, skin irritation       Family History:   Family History   Problem Relation Age of Onset    Hypertension Mother     Hypertension Father     Prostate cancer Father     No Known Problems Brother     Eczema Daughter     Eczema Son     No Known Problems Maternal Grandmother     Heart attack Maternal Grandfather     Ovarian cancer Paternal Grandmother     Breast cancer Paternal Grandmother     Glaucoma Paternal Grandfather        Social History:   Social History  "    Socioeconomic History    Marital status: Single     Spouse name: Etta    Number of children: 2    Years of education: H.S.    Highest education level: High school graduate   Tobacco Use    Smoking status: Never     Passive exposure: Never    Smokeless tobacco: Never   Vaping Use    Vaping status: Never Used   Substance and Sexual Activity    Alcohol use: Yes     Alcohol/week: 3.0 standard drinks of alcohol     Types: 3 Shots of liquor per week     Comment: socially     Drug use: Not Currently    Sexual activity: Yes     Partners: Female     Birth control/protection: None         Objective     Vital Signs  /68 (BP Location: Right arm, Patient Position: Sitting, Cuff Size: Large Adult)   Pulse 68   Ht 187.1 cm (73.66\")   Wt (!) 153 kg (337 lb)   SpO2 96%   BMI 43.67 kg/m²   Estimated body mass index is 43.67 kg/m² as calculated from the following:    Height as of this encounter: 187.1 cm (73.66\").    Weight as of this encounter: 153 kg (337 lb).          Physical Exam  Constitutional:       General: He is not in acute distress.     Appearance: He is not toxic-appearing.   Cardiovascular:      Rate and Rhythm: Normal rate and regular rhythm.      Heart sounds: No murmur heard.     No friction rub. No gallop.   Pulmonary:      Effort: Pulmonary effort is normal.      Breath sounds: Normal breath sounds.   Abdominal:      General: Abdomen is flat. There is no distension.   Skin:     General: Skin is warm and dry.      Comments: 1.5 cm soft cystic appearing outpouching of surgical incision. No pulsation   Neurological:      Mental Status: He is alert.   Psychiatric:         Mood and Affect: Mood normal.         Behavior: Behavior normal.          Assessment and Plan     1. Seroma, post-traumatic  Possible seroma around incision. Ultrasound chest ordered to evaluate further.   Check labs  - C-reactive protein; Future  - CBC w AUTO Differential; Future  - Comprehensive metabolic panel; Future  - US Chest; " Future    2. Problem involving surgical incision  US chest as above  - US Chest; Future    3. HOCM (hypertrophic obstructive cardiomyopathy)  Chronic stable  S/p ICD implantation  Continue bisoprolol       Counseling was given to patient for the following topics: instructions for management.    Follow Up  No follow-ups on file.    MD SUSANNAH [facility] PRIMARY CARE  [address] [id number] [phone]

## 2025-05-12 NOTE — LETTER
[facility]  Vaccine Consent Form    Patient Name:  Kelvin Borjas  Patient :  1990     Vaccine(s) Ordered    Tdap Vaccine => 6yo IM (BOOSTRIX/ADACEL)        Screening Checklist  The following questions should be completed prior to vaccination. If you answer “yes” to any question, it does not necessarily mean you should not be vaccinated. It just means we may need to clarify or ask more questions. If a question is unclear, please ask your healthcare provider to explain it.    Yes No   Any fever or moderate to severe illness today (mild illness and/or antibiotic treatment are not contraindications)?     Do you have a history of a serious reaction to any previous vaccinations, such as anaphylaxis, encephalopathy within 7 days, Guillain-[place] syndrome within 6 weeks, seizure?     Have you received any live vaccine(s) (e.g MMR, JOSAFAT) or any other vaccines in the last month (to ensure duplicate doses aren't given)?     Do you have an anaphylactic allergy to latex (DTaP, DTaP-IPV, Hep A, Hep B, MenB, RV, Td, Tdap), baker’s yeast (Hep B, HPV), polysorbates (RSV, nirsevimab, PCV 20, Rotavirrus, Tdap, Shingrix), or gelatin (JOSAFAT, MMR)?     Do you have an anaphylactic allergy to neomycin (Rabies, JOSAFAT, MMR, IPV, Hep A), polymyxin B (IPV), or streptomycin (IPV)?      Any cancer, leukemia, AIDS, or other immune system disorder? (JOSAFAT, MMR, RV)     Do you have a parent, brother, or sister with an immune system problem (if immune competence of vaccine recipient clinically verified, can proceed)? (MMR, JOSAFAT)     Any recent steroid treatments for >2 weeks, chemotherapy, or radiation treatment? (JOSAFAT, MMR)     Have you received antibody-containing blood transfusions or IVIG in the past 11 months (recommended interval is dependent on product)? (MMR, JOSAFAT)     Have you taken antiviral drugs (acyclovir, famciclovir, valacyclovir for JOSAFAT) in the last 24 or 48 hours, respectively?      Are you pregnant or planning to become  "pregnant within 1 month? (JOSAFAT, MMR, HPV, IPV, MenB, Abrexvy; For Hep B- refer to Engerix-B; For RSV - Abrysvo is indicated for 32-36 weeks of pregnancy from September to January)     For infants, have you ever been told your child has had intussusception or a medical emergency involving obstruction of the intestine (Rotavirus)? If not for an infant, can skip this question.         *Ordering Physicians/APC should be consulted if \"yes\" is checked by the patient or guardian above.  I have received, read, and understand the Vaccine Information Statement (VIS) for each vaccine ordered.  I have considered my or my child's health status as well as the health status of my close contacts.  I have taken the opportunity to discuss my vaccine questions with my or my child's health care provider.   I have requested that the ordered vaccine(s) be given to me or my child.  I understand the benefits and risks of the vaccines.  I understand that I should remain in the clinic for 15 minutes after receiving the vaccine(s).  _________________________________________________________  Signature of Patient or Parent/Legal Guardian ____________________  Date     "

## 2025-05-12 NOTE — LETTER
[facility]  Vaccine Consent Form    Patient Name:  Kelvin Borjas  Patient :  1990     Vaccine(s) Ordered    Tdap Vaccine => 6yo IM (BOOSTRIX/ADACEL)        Screening Checklist  The following questions should be completed prior to vaccination. If you answer “yes” to any question, it does not necessarily mean you should not be vaccinated. It just means we may need to clarify or ask more questions. If a question is unclear, please ask your healthcare provider to explain it.    Yes No   Any fever or moderate to severe illness today (mild illness and/or antibiotic treatment are not contraindications)?     Do you have a history of a serious reaction to any previous vaccinations, such as anaphylaxis, encephalopathy within 7 days, Guillain-Hamburg syndrome within 6 weeks, seizure?     Have you received any live vaccine(s) (e.g MMR, JOSAFAT) or any other vaccines in the last month (to ensure duplicate doses aren't given)?     Do you have an anaphylactic allergy to latex (DTaP, DTaP-IPV, Hep A, Hep B, MenB, RV, Td, Tdap), baker’s yeast (Hep B, HPV), polysorbates (RSV, nirsevimab, PCV 20, Rotavirrus, Tdap, Shingrix), or gelatin (JOSAFAT, MMR)?     Do you have an anaphylactic allergy to neomycin (Rabies, JOSAFAT, MMR, IPV, Hep A), polymyxin B (IPV), or streptomycin (IPV)?      Any cancer, leukemia, AIDS, or other immune system disorder? (JOSAFAT, MMR, RV)     Do you have a parent, brother, or sister with an immune system problem (if immune competence of vaccine recipient clinically verified, can proceed)? (MMR, JOSAFAT)     Any recent steroid treatments for >2 weeks, chemotherapy, or radiation treatment? (JOSAFAT, MMR)     Have you received antibody-containing blood transfusions or IVIG in the past 11 months (recommended interval is dependent on product)? (MMR, JOSAFAT)     Have you taken antiviral drugs (acyclovir, famciclovir, valacyclovir for JOSAFAT) in the last 24 or 48 hours, respectively?      Are you pregnant or planning to become  "pregnant within 1 month? (JOSAFAT, MMR, HPV, IPV, MenB, Abrexvy; For Hep B- refer to Engerix-B; For RSV - Abrysvo is indicated for 32-36 weeks of pregnancy from September to January)     For infants, have you ever been told your child has had intussusception or a medical emergency involving obstruction of the intestine (Rotavirus)? If not for an infant, can skip this question.         *Ordering Physicians/APC should be consulted if \"yes\" is checked by the patient or guardian above.  I have received, read, and understand the Vaccine Information Statement (VIS) for each vaccine ordered.  I have considered my or my child's health status as well as the health status of my close contacts.  I have taken the opportunity to discuss my vaccine questions with my or my child's health care provider.   I have requested that the ordered vaccine(s) be given to me or my child.  I understand the benefits and risks of the vaccines.  I understand that I should remain in the clinic for 15 minutes after receiving the vaccine(s).  _________________________________________________________  Signature of Patient or Parent/Legal Guardian ____________________  Date     "

## 2025-05-14 ENCOUNTER — HOSPITAL ENCOUNTER (OUTPATIENT)
Dept: ULTRASOUND IMAGING | Facility: HOSPITAL | Age: 35
Discharge: HOME | End: 2025-05-14
Admitting: STUDENT IN AN ORGANIZED HEALTH CARE EDUCATION/TRAINING PROGRAM
Payer: COMMERCIAL

## 2025-05-14 DIAGNOSIS — T79.2XXA SEROMA, POST-TRAUMATIC: ICD-10-CM

## 2025-05-14 DIAGNOSIS — T81.89XA PROBLEM INVOLVING SURGICAL INCISION: ICD-10-CM

## 2025-05-14 PROCEDURE — 76604 US EXAM CHEST: CPT

## 2025-05-16 ENCOUNTER — PATIENT ROUNDING (BHMG ONLY) (OUTPATIENT)
Age: 35
End: 2025-05-16
Payer: COMMERCIAL

## 2025-06-02 ENCOUNTER — OFFICE VISIT (OUTPATIENT)
Age: 35
End: 2025-06-02
Payer: COMMERCIAL

## 2025-06-02 VITALS
DIASTOLIC BLOOD PRESSURE: 66 MMHG | SYSTOLIC BLOOD PRESSURE: 132 MMHG | WEIGHT: 315 LBS | OXYGEN SATURATION: 98 % | HEIGHT: 74 IN | BODY MASS INDEX: 40.43 KG/M2 | HEART RATE: 67 BPM

## 2025-06-02 DIAGNOSIS — L76.34 SEROMA OF SKIN OR SUBCUTANEOUS TISSUE AFTER NON-DERMATOLOGIC PROCEDURE: ICD-10-CM

## 2025-06-02 DIAGNOSIS — M79.645 PAIN OF LEFT THUMB: Primary | ICD-10-CM

## 2025-06-02 PROCEDURE — 99214 OFFICE O/P EST MOD 30 MIN: CPT | Performed by: STUDENT IN AN ORGANIZED HEALTH CARE EDUCATION/TRAINING PROGRAM

## 2025-06-02 RX ORDER — DOXYCYCLINE 100 MG/1
100 CAPSULE ORAL 2 TIMES DAILY
Qty: 14 CAPSULE | Refills: 0 | Status: SHIPPED | OUTPATIENT
Start: 2025-06-02 | End: 2025-06-09

## 2025-06-02 NOTE — PROGRESS NOTES
Office Note     Name: Kelvin Borjas    : 1990     MRN: [id number]     Chief Complaint  Hand Pain (Left thumb) and Wound Check    Subjective     History of Present Illness:  Kelvin Borjas is a 34 y.o. male who presents today for follow up on chest wound and new complaint of thumb injury. His seroma ruptured and leaked clear fluid but later turned more yellow.  He has no systemic sxs. Wound has not yet closed and he has trip to the beach in July.  He also has left thumb pain after injuring it playing basketball. It did pop and swell    Past Medical History:   Past Medical History:   Diagnosis Date    Cardiomyopathy     Obesity     Pes planus        Past Surgical History:   Past Surgical History:   Procedure Laterality Date    ICD INSERTION N/A 2024    Procedure: SICD Implant (BSC), no meds to hold, nerve block;  Surgeon: Tomi Davis MD;  Location: [facility] INVASIVE LOCATION;  Service: Cardiovascular;  Laterality: N/A;  Nerve block    WISDOM TOOTH EXTRACTION         Immunizations:   Immunization History   Administered Date(s) Administered    Hep B, Adolescent or Pediatric 2001, 2001, 2002    MMR 1997    Tdap 2014, 2025        Medications:     Current Outpatient Medications:     bisoprolol (ZEBeta) 5 MG tablet, Take 0.5 tablets by mouth Daily., Disp: 15 tablet, Rfl: 11    doxycycline (VIBRAMYCIN) 100 MG capsule, Take 1 capsule by mouth 2 (Two) Times a Day for 7 days., Disp: 14 capsule, Rfl: 0    Allergies:   Allergies   Allergen Reactions    Aquacel Extra Hydrofiber [Wound Dressings] Itching     Rash, skin irritation       Family History:   Family History   Problem Relation Age of Onset    Hypertension Mother     Hypertension Father     Prostate cancer Father     No Known Problems Brother     Eczema Daughter     Eczema Son     No Known Problems Maternal Grandmother     Heart attack Maternal Grandfather     Ovarian cancer Paternal Grandmother      "Breast cancer Paternal Grandmother     Glaucoma Paternal Grandfather        Social History:   Social History     Socioeconomic History    Marital status: Single     Spouse name: Etta    Number of children: 2    Years of education: H.S.    Highest education level: High school graduate   Tobacco Use    Smoking status: Never     Passive exposure: Never    Smokeless tobacco: Never   Vaping Use    Vaping status: Never Used   Substance and Sexual Activity    Alcohol use: Yes     Alcohol/week: 3.0 standard drinks of alcohol     Types: 3 Shots of liquor per week     Comment: socially     Drug use: Not Currently    Sexual activity: Yes     Partners: Female     Birth control/protection: None         Objective     Vital Signs  /66 (BP Location: Left arm, Patient Position: Sitting, Cuff Size: Large Adult)   Pulse 67   Ht 187.1 cm (73.66\")   Wt (!) 150 kg (331 lb 8 oz)   SpO2 98%   BMI 42.95 kg/m²   Estimated body mass index is 42.95 kg/m² as calculated from the following:    Height as of this encounter: 187.1 cm (73.66\").    Weight as of this encounter: 150 kg (331 lb 8 oz).          Physical Exam  Constitutional:       General: He is not in acute distress.     Appearance: He is not toxic-appearing.   Pulmonary:      Effort: Pulmonary effort is normal. No respiratory distress.   Abdominal:      General: Abdomen is flat. There is no distension.   Skin:     General: Skin is warm and dry.      Comments: 3 mm defect on incision site where seroma was located   Neurological:      Mental Status: He is alert.   Psychiatric:         Mood and Affect: Mood normal.         Behavior: Behavior normal.          Assessment and Plan     1. Pain of left thumb  XR to make sure it is not broken  - XR Hand 3+ View Left; Future    2. Seroma of skin or subcutaneous tissue after non-dermatologic procedure  Some yellow drainage, applied steri strips and rx doxy, advised to follow up before trip if it is not closed  - doxycycline " (VIBRAMYCIN) 100 MG capsule; Take 1 capsule by mouth 2 (Two) Times a Day for 7 days.  Dispense: 14 capsule; Refill: 0        Counseling was given to patient for the following topics: instructions for management.    Follow Up  No follow-ups on file.    Seymour Roy MD  [facility]  [address] [address]  [phone]

## 2025-06-30 ENCOUNTER — TELEPHONE (OUTPATIENT)
Dept: CARDIOLOGY | Facility: CLINIC | Age: 35
End: 2025-06-30
Payer: COMMERCIAL

## 2025-06-30 NOTE — TELEPHONE ENCOUNTER
Patient called and left a message stating that he has a seroma that he thinks has ruptured.    I tried to call him back to f/u but he did not answer.I left a message for him to call me back at the office.

## 2025-07-01 ENCOUNTER — TELEPHONE (OUTPATIENT)
Dept: CARDIOLOGY | Facility: CLINIC | Age: 35
End: 2025-07-01
Payer: COMMERCIAL

## 2025-07-01 ENCOUNTER — PREP FOR SURGERY (OUTPATIENT)
Dept: OTHER | Facility: HOSPITAL | Age: 35
End: 2025-07-01
Payer: COMMERCIAL

## 2025-07-01 DIAGNOSIS — T81.328D STERNAL WOUND DEHISCENCE, SUBSEQUENT ENCOUNTER: Primary | ICD-10-CM

## 2025-07-01 PROBLEM — T81.328A STERNAL WOUND DEHISCENCE: Status: ACTIVE | Noted: 2025-04-30

## 2025-07-01 RX ORDER — ONDANSETRON 2 MG/ML
4 INJECTION INTRAMUSCULAR; INTRAVENOUS EVERY 6 HOURS PRN
Status: CANCELLED | OUTPATIENT
Start: 2025-07-01

## 2025-07-01 RX ORDER — NITROGLYCERIN 0.4 MG/1
0.4 TABLET SUBLINGUAL
Status: CANCELLED | OUTPATIENT
Start: 2025-07-01

## 2025-07-01 RX ORDER — SODIUM CHLORIDE 9 MG/ML
40 INJECTION, SOLUTION INTRAVENOUS AS NEEDED
Status: CANCELLED | OUTPATIENT
Start: 2025-07-01

## 2025-07-01 RX ORDER — SODIUM CHLORIDE 0.9 % (FLUSH) 0.9 %
1-10 SYRINGE (ML) INJECTION AS NEEDED
Status: CANCELLED | OUTPATIENT
Start: 2025-07-01

## 2025-07-01 RX ORDER — ACETAMINOPHEN 325 MG/1
650 TABLET ORAL EVERY 4 HOURS PRN
Status: CANCELLED | OUTPATIENT
Start: 2025-07-01

## 2025-07-01 RX ORDER — CEFAZOLIN SODIUM 2 G/100ML
2000 INJECTION, SOLUTION INTRAVENOUS ONCE
Status: CANCELLED | OUTPATIENT
Start: 2025-07-01 | End: 2025-07-01

## 2025-07-01 RX ORDER — SODIUM CHLORIDE 0.9 % (FLUSH) 0.9 %
10 SYRINGE (ML) INJECTION EVERY 12 HOURS SCHEDULED
Status: CANCELLED | OUTPATIENT
Start: 2025-07-01

## 2025-07-01 NOTE — TELEPHONE ENCOUNTER
Patient came into the office for a wound check.  Dr. Davis and I at bedside.  Patient sternal wound from SICD placement back in October 2024 has an open area about 1 cm x 1 cm x 1 cm.  Patient states this has been draining clear yellow drainage.  He was seen by his PCP and prescribed doxycycline which may have slightly improved drainage.  Patient feels because of his movement the area cannot close.  Not able to see Poles or any part of the device.  Patient denies fever, pain, or any other infectious like symptoms.  Will set patient up for sternal wound debridement tomorrow with Dr. Davis in the EP lab.  NPO after midnight. Patient will start on Linezolid 600 mg twice daily for 14 days.     SANTA Harley

## 2025-07-02 ENCOUNTER — HOSPITAL ENCOUNTER (OUTPATIENT)
Facility: HOSPITAL | Age: 35
Setting detail: HOSPITAL OUTPATIENT SURGERY
Discharge: HOME | End: 2025-07-02
Attending: INTERNAL MEDICINE | Admitting: INTERNAL MEDICINE
Payer: COMMERCIAL

## 2025-07-02 VITALS
HEART RATE: 44 BPM | RESPIRATION RATE: 16 BRPM | TEMPERATURE: 97.4 F | OXYGEN SATURATION: 98 % | SYSTOLIC BLOOD PRESSURE: 119 MMHG | DIASTOLIC BLOOD PRESSURE: 71 MMHG

## 2025-07-02 DIAGNOSIS — T81.328D STERNAL WOUND DEHISCENCE, SUBSEQUENT ENCOUNTER: ICD-10-CM

## 2025-07-02 LAB
ANION GAP SERPL CALCULATED.3IONS-SCNC: 9.5 MMOL/L (ref 5–15)
BUN SERPL-MCNC: 10.5 MG/DL (ref 6–20)
BUN/CREAT SERPL: 12.2 (ref 7–25)
CALCIUM SPEC-SCNC: 8.9 MG/DL (ref 8.6–10.5)
CHLORIDE SERPL-SCNC: 107 MMOL/L (ref 98–107)
CO2 SERPL-SCNC: 21.5 MMOL/L (ref 22–29)
CREAT SERPL-MCNC: 0.86 MG/DL (ref 0.76–1.27)
DEPRECATED RDW RBC AUTO: 39 FL (ref 37–54)
EGFRCR SERPLBLD CKD-EPI 2021: 116.5 ML/MIN/1.73
ERYTHROCYTE [DISTWIDTH] IN BLOOD BY AUTOMATED COUNT: 12.7 % (ref 12.3–15.4)
GLUCOSE SERPL-MCNC: 103 MG/DL (ref 65–99)
HCT VFR BLD AUTO: 43.3 % (ref 37.5–51)
HGB BLD-MCNC: 14.4 G/DL (ref 13–17.7)
MCH RBC QN AUTO: 28 PG (ref 26.6–33)
MCHC RBC AUTO-ENTMCNC: 33.3 G/DL (ref 31.5–35.7)
MCV RBC AUTO: 84.2 FL (ref 79–97)
PLATELET # BLD AUTO: 260 10*3/MM3 (ref 140–450)
PMV BLD AUTO: 10.4 FL (ref 6–12)
POTASSIUM SERPL-SCNC: 3.8 MMOL/L (ref 3.5–5.2)
RBC # BLD AUTO: 5.14 10*6/MM3 (ref 4.14–5.8)
SODIUM SERPL-SCNC: 138 MMOL/L (ref 136–145)
WBC NRBC COR # BLD AUTO: 7.96 10*3/MM3 (ref 3.4–10.8)

## 2025-07-02 PROCEDURE — 25010000002 MIDAZOLAM PER 1 MG: Performed by: INTERNAL MEDICINE

## 2025-07-02 PROCEDURE — 85027 COMPLETE CBC AUTOMATED: CPT | Performed by: INTERNAL MEDICINE

## 2025-07-02 PROCEDURE — 25010000002 LIDOCAINE 1% - EPINEPHRINE 1:100000 1 %-1:100000 SOLUTION: Performed by: INTERNAL MEDICINE

## 2025-07-02 PROCEDURE — 99153 MOD SED SAME PHYS/QHP EA: CPT | Performed by: INTERNAL MEDICINE

## 2025-07-02 PROCEDURE — 36415 COLL VENOUS BLD VENIPUNCTURE: CPT

## 2025-07-02 PROCEDURE — 25010000002 CEFAZOLIN PER 500 MG: Performed by: INTERNAL MEDICINE

## 2025-07-02 PROCEDURE — 11042 DBRDMT SUBQ TIS 1ST 20SQCM/<: CPT | Performed by: INTERNAL MEDICINE

## 2025-07-02 PROCEDURE — 99152 MOD SED SAME PHYS/QHP 5/>YRS: CPT | Performed by: INTERNAL MEDICINE

## 2025-07-02 PROCEDURE — 25010000002 ONDANSETRON PER 1 MG: Performed by: INTERNAL MEDICINE

## 2025-07-02 PROCEDURE — 80048 BASIC METABOLIC PNL TOTAL CA: CPT | Performed by: INTERNAL MEDICINE

## 2025-07-02 PROCEDURE — 25010000002 FENTANYL CITRATE (PF) 50 MCG/ML SOLUTION: Performed by: INTERNAL MEDICINE

## 2025-07-02 PROCEDURE — 25810000003 SODIUM CHLORIDE 0.9 % SOLUTION: Performed by: INTERNAL MEDICINE

## 2025-07-02 RX ORDER — CEFAZOLIN SODIUM 1 G/3ML
INJECTION, POWDER, FOR SOLUTION INTRAMUSCULAR; INTRAVENOUS
Status: DISCONTINUED | OUTPATIENT
Start: 2025-07-02 | End: 2025-07-02 | Stop reason: HOSPADM

## 2025-07-02 RX ORDER — FENTANYL CITRATE 50 UG/ML
INJECTION, SOLUTION INTRAMUSCULAR; INTRAVENOUS
Status: DISCONTINUED | OUTPATIENT
Start: 2025-07-02 | End: 2025-07-02 | Stop reason: HOSPADM

## 2025-07-02 RX ORDER — ONDANSETRON 2 MG/ML
INJECTION INTRAMUSCULAR; INTRAVENOUS
Status: DISCONTINUED | OUTPATIENT
Start: 2025-07-02 | End: 2025-07-02 | Stop reason: HOSPADM

## 2025-07-02 RX ORDER — SODIUM CHLORIDE 0.9 % (FLUSH) 0.9 %
10 SYRINGE (ML) INJECTION EVERY 12 HOURS SCHEDULED
Status: DISCONTINUED | OUTPATIENT
Start: 2025-07-02 | End: 2025-07-02 | Stop reason: HOSPADM

## 2025-07-02 RX ORDER — ETOMIDATE 2 MG/ML
INJECTION INTRAVENOUS
Status: DISCONTINUED | OUTPATIENT
Start: 2025-07-02 | End: 2025-07-02 | Stop reason: HOSPADM

## 2025-07-02 RX ORDER — NITROGLYCERIN 0.4 MG/1
0.4 TABLET SUBLINGUAL
Status: DISCONTINUED | OUTPATIENT
Start: 2025-07-02 | End: 2025-07-02 | Stop reason: HOSPADM

## 2025-07-02 RX ORDER — MIDAZOLAM HYDROCHLORIDE 1 MG/ML
INJECTION, SOLUTION INTRAMUSCULAR; INTRAVENOUS
Status: DISCONTINUED | OUTPATIENT
Start: 2025-07-02 | End: 2025-07-02 | Stop reason: HOSPADM

## 2025-07-02 RX ORDER — SODIUM CHLORIDE 9 MG/ML
40 INJECTION, SOLUTION INTRAVENOUS AS NEEDED
Status: DISCONTINUED | OUTPATIENT
Start: 2025-07-02 | End: 2025-07-02 | Stop reason: HOSPADM

## 2025-07-02 RX ORDER — SODIUM CHLORIDE 0.9 % (FLUSH) 0.9 %
10 SYRINGE (ML) INJECTION AS NEEDED
Status: DISCONTINUED | OUTPATIENT
Start: 2025-07-02 | End: 2025-07-02 | Stop reason: HOSPADM

## 2025-07-02 RX ORDER — ACETAMINOPHEN 325 MG/1
650 TABLET ORAL EVERY 4 HOURS PRN
Status: DISCONTINUED | OUTPATIENT
Start: 2025-07-02 | End: 2025-07-02 | Stop reason: HOSPADM

## 2025-07-02 RX ORDER — ONDANSETRON 2 MG/ML
4 INJECTION INTRAMUSCULAR; INTRAVENOUS EVERY 6 HOURS PRN
Status: DISCONTINUED | OUTPATIENT
Start: 2025-07-02 | End: 2025-07-02 | Stop reason: HOSPADM

## 2025-07-02 RX ORDER — CEFAZOLIN SODIUM 2 G/100ML
2000 INJECTION, SOLUTION INTRAVENOUS ONCE
Status: DISCONTINUED | OUTPATIENT
Start: 2025-07-02 | End: 2025-07-02

## 2025-07-02 RX ORDER — SODIUM CHLORIDE 9 MG/ML
INJECTION, SOLUTION INTRAVENOUS
Status: COMPLETED | OUTPATIENT
Start: 2025-07-02 | End: 2025-07-02

## 2025-07-02 RX ORDER — SODIUM CHLORIDE 0.9 % (FLUSH) 0.9 %
1-10 SYRINGE (ML) INJECTION AS NEEDED
Status: DISCONTINUED | OUTPATIENT
Start: 2025-07-02 | End: 2025-07-02 | Stop reason: HOSPADM

## 2025-07-02 RX ORDER — LIDOCAINE HYDROCHLORIDE AND EPINEPHRINE 10; 10 MG/ML; UG/ML
INJECTION, SOLUTION INFILTRATION; PERINEURAL
Status: DISCONTINUED | OUTPATIENT
Start: 2025-07-02 | End: 2025-07-02 | Stop reason: HOSPADM

## 2025-07-02 RX ORDER — LINEZOLID 600 MG/1
600 TABLET, FILM COATED ORAL 2 TIMES DAILY
COMMUNITY

## 2025-07-02 RX ADMIN — SODIUM CHLORIDE 2000 MG: 900 INJECTION INTRAVENOUS at 13:59

## 2025-07-02 NOTE — H&P
Patient Care Team:  Tomi Roy MD as PCP - General (Family Medicine)  Issac Valenzuela MD as Consulting Physician (Cardiology)  Aubrie Ramon APRN as Nurse Practitioner (Cardiology)  Tomi Davis MD as Consulting Physician (Cardiology)    Chief complaint Sternal wound dehiscence    Problem List  HOCM  10/2024 cardiac MRI hypertrophic cardiomyopathy with maximum wall thickness of 34 mm in the basal/mid inferior septum.  EF 54%.  No significant LVOT obstruction.  Diffuse heterogeneous enhancement especially in the mid and distal segments involving greater than 15% of the myocardium.  No significant valve abnormalities.  11/19/2024 SICD implantation  Morbid obesity  Surgeries:  Stanton teeth extraction    Subjective     History of Present Illness  Patient is a 34-year-old gentleman with a history of hypertrophic cardiomyopathy.  He underwent subcutaneous ICD implantation on 11/19/2024 by Dr. Davis.  Patient presented to clinic yesterday for wound check as he was concerned in regards to a 1 cm x 1 cm x 1 cm open area on the sternum.  Patient states this has been draining clear yellow drainage. He was seen by his PCP and prescribed doxycycline which may have slightly improved drainage. Patient feels because of his movement the area cannot close.  He denies fever.  He presents today to undergo wound debridement by Dr. Davis.          Past Medical History:   Diagnosis Date    Cardiomyopathy     Obesity     Pes planus      Past Surgical History:   Procedure Laterality Date    ICD INSERTION N/A 11/19/2024    Procedure: SICD Implant (BS), no meds to hold, nerve block;  Surgeon: Tomi Davis MD;  Location: [facility] INVASIVE LOC[facility];  Service: Cardiovascular;  Laterality: N/A;  Nerve block    WISDOM TOOTH EXTRACTION       Family History   Problem Relation Age of Onset    Hypertension Mother     Hypertension Father     Prostate cancer Father     No Known Problems Brother     Eczema Daughter     Eczema Son     No Known  Problems Maternal Grandmother     Heart attack Maternal Grandfather     Ovarian cancer Paternal Grandmother     Breast cancer Paternal Grandmother     Glaucoma Paternal Grandfather      Social History     Tobacco Use    Smoking status: Never     Passive exposure: Never    Smokeless tobacco: Never   Vaping Use    Vaping status: Never Used   Substance Use Topics    Alcohol use: Yes     Alcohol/week: 3.0 standard drinks of alcohol     Types: 3 Shots of liquor per week     Comment: socially     Drug use: Not Currently       Objective      Vital Signs  Temp:  [97.4 °F (36.3 °C)] 97.4 °F (36.3 °C)  Heart Rate:  [54] 54  BP: (127-134)/(69-75) 134/75    Physical Exam  Vitals reviewed.   Constitutional:       Appearance: Normal appearance. He is normal weight.   HENT:      Head: Normocephalic and atraumatic.      Mouth/Throat:      Mouth: Mucous membranes are moist.      Pharynx: Oropharynx is clear.   Eyes:      Extraocular Movements: Extraocular movements intact.      Conjunctiva/sclera: Conjunctivae normal.      Pupils: Pupils are equal, round, and reactive to light.   Cardiovascular:      Rate and Rhythm: Normal rate and regular rhythm.      Pulses: Normal pulses.      Heart sounds: Normal heart sounds. No murmur heard.  Pulmonary:      Effort: Pulmonary effort is normal.      Breath sounds: Normal breath sounds.   Abdominal:      General: Abdomen is flat. Bowel sounds are normal.      Palpations: Abdomen is soft.   Musculoskeletal:      Right lower leg: No edema.      Left lower leg: No edema.   Skin:     General: Skin is warm and dry.   Neurological:      General: No focal deficit present.      Mental Status: He is alert and oriented to person, place, and time. Mental status is at baseline.         Results Review:    I reviewed the patient's new clinical results.  I reviewed the patient's new imaging results and agree with the interpretation.  I reviewed the patient's other test results and agree with the  interpretation      Assessment & Plan   Sternal wound dehiscence  Patient is a 34-year-old gentleman with a history of hypertrophic cardiomyopathy.  He underwent subcutaneous ICD implantation on 11/19/2024 by Dr. Davis.  Patient presented to clinic yesterday for wound check as he was concerned in regards to a 1 cm x 1 cm x 1 cm open area on the sternum.  Patient states this has been draining clear yellow drainage. He was seen by his PCP and prescribed doxycycline which may have slightly improved drainage. Patient feels because of his movement the area cannot close.  He denies fever.  He presents today to undergo wound debridement by Dr. Davis.      The patient was prescribed Linezolid 600 mg twice daily for 14 days.  After leaving the office yesterday he had a rash to pick his kids up and forgot to pick his prescription up.  He has not started these yet.    The patient was able to give written informed consent after revisiting the key portions of the risk versus benefit profile of the procedure.  This discussion was framed by our lengthy conversations (please see our detailed notes).  Patient verbalized strong understanding of this discussion and a strong desire to proceed with the procedure.        SANTA Bautista  Electrophysiology  [facility]

## 2025-07-11 ENCOUNTER — OFFICE VISIT (OUTPATIENT)
Dept: CARDIOLOGY | Facility: CLINIC | Age: 35
End: 2025-07-11
Payer: COMMERCIAL

## 2025-07-11 DIAGNOSIS — T81.328D STERNAL WOUND DEHISCENCE, SUBSEQUENT ENCOUNTER: Primary | ICD-10-CM

## 2025-07-11 NOTE — PROGRESS NOTES
2025    Kelvin Borjas, : 1990      Fever: No    Temperature if indicated:     Wound Location: Mid Auxiliary     Dressing Removed: Removed by MA/RN      Old Dressing Appearance:  Old, bloody drainage    Wound Appearance: Redness []                  Drainage []                  Culture obtained []        Color: N/A     Consistency:       Amount: none         Gloves used, wound cleansed with sterile 4x4 and peroxide [x]       MD notified []     MD orders:     Antibiotic started []      If checked, type     Other: Patient had a debridement on 2025. Wound looked good no redness or signs of infection. Patient asked when could soak in water. Asked Will Nubia AGUILERA and advised patient to wait 6 more weeks for infection prevention      Appointment for follow-up scheduled for 3 months post procedure [x]    Future Appointments   Date Time Provider Department Center   10/2/2025  9:30 AM Tomi Davis MD [facility] [place], 25      MD Signature:______________________________ Completed By/Date:

## 2025-07-25 ENCOUNTER — TELEPHONE (OUTPATIENT)
Dept: CARDIOLOGY | Facility: CLINIC | Age: 35
End: 2025-07-25
Payer: COMMERCIAL

## 2025-07-25 LAB
MC_CV_MDC_IDC_RATE_1: 200
MC_CV_MDC_IDC_RATE_1: 250
MC_CV_MDC_IDC_SHOCK_MEASURED_IMPEDANCE: 70
MC_CV_MDC_IDC_THERAPIES: NORMAL
MC_CV_MDC_IDC_THERAPIES: NORMAL
MC_CV_MDC_IDC_ZONE_ID: 1
MC_CV_MDC_IDC_ZONE_ID: 2
MDC_IDC_MSMT_BATTERY_REMAINING_PERCENTAGE: 92 %
MDC_IDC_MSMT_BATTERY_STATUS: NORMAL
MDC_IDC_PG_IMPLANT_DTM: NORMAL
MDC_IDC_PG_MFG: NORMAL
MDC_IDC_PG_MODEL: NORMAL
MDC_IDC_PG_SERIAL: NORMAL
MDC_IDC_PG_TYPE: NORMAL
MDC_IDC_SESS_DTM: NORMAL
MDC_IDC_SESS_TYPE: NORMAL
MDC_IDC_SET_ZONE_STATUS: NORMAL
MDC_IDC_SET_ZONE_STATUS: NORMAL
MDC_IDC_SET_ZONE_TYPE: NORMAL
MDC_IDC_SET_ZONE_TYPE: NORMAL
MDC_IDC_STAT_TACHYTHERAPY_SHOCKS_DELIVERED_RECENT: 0

## 2025-07-25 NOTE — TELEPHONE ENCOUNTER
Called patient because we hadn't received the scheduled reading from his bedside home monitor. Patient informed me he has been on vacation and will send in a reading when he gets the time.

## 2025-07-30 ENCOUNTER — TELEPHONE (OUTPATIENT)
Dept: CARDIOLOGY | Facility: CLINIC | Age: 35
End: 2025-07-30
Payer: COMMERCIAL

## 2025-08-07 LAB
MC_CV_MDC_IDC_RATE_1: 200
MC_CV_MDC_IDC_RATE_1: 250
MC_CV_MDC_IDC_SHOCK_MEASURED_IMPEDANCE: 70
MC_CV_MDC_IDC_THERAPIES: NORMAL
MC_CV_MDC_IDC_THERAPIES: NORMAL
MC_CV_MDC_IDC_ZONE_ID: 1
MC_CV_MDC_IDC_ZONE_ID: 2
MDC_IDC_MSMT_BATTERY_REMAINING_PERCENTAGE: 92 %
MDC_IDC_MSMT_BATTERY_STATUS: NORMAL
MDC_IDC_PG_IMPLANT_DTM: NORMAL
MDC_IDC_PG_MFG: NORMAL
MDC_IDC_PG_MODEL: NORMAL
MDC_IDC_PG_SERIAL: NORMAL
MDC_IDC_PG_TYPE: NORMAL
MDC_IDC_SESS_DTM: NORMAL
MDC_IDC_SESS_TYPE: NORMAL
MDC_IDC_SET_ZONE_STATUS: NORMAL
MDC_IDC_SET_ZONE_STATUS: NORMAL
MDC_IDC_SET_ZONE_TYPE: NORMAL
MDC_IDC_SET_ZONE_TYPE: NORMAL
MDC_IDC_STAT_TACHYTHERAPY_SHOCKS_DELIVERED_RECENT: 0

## (undated) DEVICE — TUBING, SUCTION, 1/4" X 10', STRAIGHT: Brand: MEDLINE

## (undated) DEVICE — ADULT, W/LG. BACK PAD, RADIOTRANSPARENT ELEMENT AND LEAD WIRE COMPATIBLE W/: Brand: DEFIBRILLATION ELECTRODES

## (undated) DEVICE — ADULT NASAL CO2 SAMPLING WITH O2 DELIVERY CANNULA FOR CAPNOFLEX MODULE: Brand: VITAL SIGNS™

## (undated) DEVICE — DRSNG SURG AQUACEL AG/ADVNTGE 9X15CM 3.5X6IN

## (undated) DEVICE — LIMB HOLDER, WRIST/ANKLE: Brand: DEROYAL

## (undated) DEVICE — ST INF PRI SMRTSTE 20DRP 2VLV 24ML 117

## (undated) DEVICE — IRRIGATOR BULB ASEPTO 60CC STRL

## (undated) DEVICE — DECANT BG O JET

## (undated) DEVICE — YANKAUER,BULB TIP,W/O VENT,RIGID,STERILE: Brand: MEDLINE

## (undated) DEVICE — PLASMABLADE PS210-030S 3.0S LOCK: Brand: PLASMABLADE™

## (undated) DEVICE — LEX ELECTRO PHYSIOLOGY: Brand: MEDLINE INDUSTRIES, INC.

## (undated) DEVICE — SOL NACL 0.9PCT 1000ML

## (undated) DEVICE — CANN NASL CAPNOFLEX SMPL CO2/O2 W/O2/DEL A/

## (undated) DEVICE — SET PRIMARY GRVTY 10DP MALE LL 104IN

## (undated) DEVICE — ELECTRD RETRN/GRND MEGADYNE SGL/PLT W/CORD 9FT DISP

## (undated) DEVICE — PLASMABLADE X PS210-030S-LIGHT 3.0SL: Brand: PLASMABLADE™ X

## (undated) DEVICE — ST EXT IV SMRTSTE 2VLV FIX M LL 6ML 41

## (undated) DEVICE — CAUTERY TIP POLISHER: Brand: DEVON